# Patient Record
Sex: FEMALE | Race: WHITE | NOT HISPANIC OR LATINO | Employment: FULL TIME | URBAN - METROPOLITAN AREA
[De-identification: names, ages, dates, MRNs, and addresses within clinical notes are randomized per-mention and may not be internally consistent; named-entity substitution may affect disease eponyms.]

---

## 2021-05-29 ENCOUNTER — RECORDS - HEALTHEAST (OUTPATIENT)
Dept: ADMINISTRATIVE | Facility: CLINIC | Age: 36
End: 2021-05-29

## 2021-05-30 ENCOUNTER — RECORDS - HEALTHEAST (OUTPATIENT)
Dept: ADMINISTRATIVE | Facility: CLINIC | Age: 36
End: 2021-05-30

## 2021-05-31 ENCOUNTER — RECORDS - HEALTHEAST (OUTPATIENT)
Dept: ADMINISTRATIVE | Facility: CLINIC | Age: 36
End: 2021-05-31

## 2021-11-22 NOTE — PROGRESS NOTES
"  Assessment & Plan     Chronic bilateral low back pain with bilateral sciatica  Continue medication regimen as below.  Increasing gabapentin dose.  Patient has failed conservative treatments.  Will re-image (last was 2016) and send to spine.   - methocarbamol (ROBAXIN) 500 MG tablet; Take 1-2 tablets (500-1,000 mg) by mouth 4 times daily as needed for muscle spasms  - gabapentin (NEURONTIN) 300 MG capsule; Take 2 capsules (600 mg) by mouth At Bedtime  - Spine Referral; Future  - MR Lumbar Spine w/o Contrast; Future  - XR Pelvis and Hip Bilateral 2 Views; Future    Pelvic pain in female  Chronic, right sided  Has history of hysterectomy and left oophorectomy.  Right ovary remains.  CT scan in 2021 for diverticulitis showed no adnexal abnormality and pain has not recently changed.  Referring to gynecology for this and management of HRT as she is high risk for complications with her smoking history.  - Ob/Gyn Referral; Future    History of ovarian cyst  - Ob/Gyn Referral; Future    Hormone replacement therapy  - Ob/Gyn Referral; Future    High priority for 2019-nCoV vaccine  First of series today.   - COVID-19,PF,MODERNA (18+ Yrs Primary Series .5mL)    Ordering of each unique test  Prescription drug management  25 minutes spent on the date of the encounter doing chart review, history and exam, documentation and further activities per the note       Tobacco Cessation:   reports that she has been smoking. She has a 1.00 pack-year smoking history. She does not have any smokeless tobacco history on file.  Tobacco Cessation Action Plan: Information offered: Patient not interested at this time    BMI:   Estimated body mass index is 31.59 kg/m  as calculated from the following:    Height as of this encounter: 1.57 m (5' 1.8\").    Weight as of this encounter: 77.8 kg (171 lb 9.6 oz).   Weight management plan: Discussed healthy diet and exercise guidelines    Patient Instructions   Schedule MRI of back:  [phone]      No " "follow-ups on file.    Christina Lopez, FELECIA CNP  [facility]IN[facility] Anne is a 36 year old who presents for the following health issues     HPI     New patient; establishing care.  Would like to discuss her back pain.      Takes ibuprofen and gabapentin- does help her nerve pain and sleep but only takes at night.  Wary of taking medications during the daytime that could cause drowsiness (has two children and work).  methocarbamol worked for muscle spasms.  Ran out of this prescription.     History back pain x many years.  MRI 2016.  History back injections.  Mildly helpful.  Last completed physical therapy last year, made pain worse per patient.     Nerve pain left leg, some weakness      Review of Systems   Constitutional, HEENT, cardiovascular, pulmonary, GI, , musculoskeletal, neuro, skin, endocrine and psych systems are negative, except as otherwise noted.      Objective    /77 (BP Location: Left arm, Patient Position: Sitting, Cuff Size: Adult Regular)   Pulse 71   Temp 97.7  F (36.5  C) (Tympanic)   Ht 1.57 m (5' 1.8\")   Wt 77.8 kg (171 lb 9.6 oz)   SpO2 96%   BMI 31.59 kg/m    Body mass index is 31.59 kg/m .  Physical Exam   GENERAL: healthy, alert and no distress  NECK: no adenopathy, no asymmetry, masses, or scars and thyroid normal to palpation  RESP: lungs clear to auscultation - no rales, rhonchi or wheezes  CV: regular rate and rhythm, normal S1 S2, no S3 or S4, no murmur, click or rub, no peripheral edema and peripheral pulses strong  ABDOMEN: soft, nontender, no hepatosplenomegaly, no masses and bowel sounds normal  MS: no gross musculoskeletal defects noted, no edema  NEURO: Normal strength and tone, mentation intact and speech normal  Comprehensive back pain exam:  Tenderness of lumbar paraspinals, Pain limits the following motions: all planes, especially extension, Lower extremity strength functional and equal on both sides, Lower extremity " reflexes within normal limits bilaterally, Lower extremity sensation normal and equal on both sides and Straight leg positive on  left, indicating possible ipsilateral radiculopathy  PSYCH: mentation appears normal, affect normal/bright              Answers for HPI/ROS submitted by the patient on 11/29/2021  CAMILO 7 TOTAL SCORE: 21

## 2021-11-29 ENCOUNTER — OFFICE VISIT (OUTPATIENT)
Dept: FAMILY MEDICINE | Facility: CLINIC | Age: 36
End: 2021-11-29
Payer: COMMERCIAL

## 2021-11-29 VITALS
SYSTOLIC BLOOD PRESSURE: 110 MMHG | BODY MASS INDEX: 31.58 KG/M2 | WEIGHT: 171.6 LBS | HEART RATE: 71 BPM | DIASTOLIC BLOOD PRESSURE: 77 MMHG | HEIGHT: 62 IN | OXYGEN SATURATION: 96 % | TEMPERATURE: 97.7 F

## 2021-11-29 DIAGNOSIS — Z79.890 HORMONE REPLACEMENT THERAPY: ICD-10-CM

## 2021-11-29 DIAGNOSIS — Z23 HIGH PRIORITY FOR 2019-NCOV VACCINE: ICD-10-CM

## 2021-11-29 DIAGNOSIS — M54.41 CHRONIC BILATERAL LOW BACK PAIN WITH BILATERAL SCIATICA: Primary | ICD-10-CM

## 2021-11-29 DIAGNOSIS — Z87.42 HISTORY OF OVARIAN CYST: ICD-10-CM

## 2021-11-29 DIAGNOSIS — Z90.721 HISTORY OF LEFT OOPHORECTOMY: ICD-10-CM

## 2021-11-29 DIAGNOSIS — R10.2 PELVIC PAIN IN FEMALE: ICD-10-CM

## 2021-11-29 DIAGNOSIS — M54.42 CHRONIC BILATERAL LOW BACK PAIN WITH BILATERAL SCIATICA: Primary | ICD-10-CM

## 2021-11-29 DIAGNOSIS — Z90.710 HISTORY OF HYSTERECTOMY: ICD-10-CM

## 2021-11-29 DIAGNOSIS — G89.29 CHRONIC BILATERAL LOW BACK PAIN WITH BILATERAL SCIATICA: Primary | ICD-10-CM

## 2021-11-29 PROBLEM — A60.04 HERPES SIMPLEX VULVOVAGINITIS: Status: ACTIVE | Noted: 2018-05-10

## 2021-11-29 PROBLEM — K57.92 ACUTE DIVERTICULITIS: Status: ACTIVE | Noted: 2019-04-26

## 2021-11-29 PROBLEM — N90.4 LICHEN SCLEROSUS ET ATROPHICUS OF THE VULVA: Status: ACTIVE | Noted: 2018-09-24

## 2021-11-29 PROBLEM — F41.1 GAD (GENERALIZED ANXIETY DISORDER): Status: ACTIVE | Noted: 2019-12-29

## 2021-11-29 PROCEDURE — 99204 OFFICE O/P NEW MOD 45 MIN: CPT | Mod: 25 | Performed by: NURSE PRACTITIONER

## 2021-11-29 PROCEDURE — 0011A COVID-19,PF,MODERNA (18+ YRS PRIMARY SERIES .5ML): CPT | Performed by: NURSE PRACTITIONER

## 2021-11-29 PROCEDURE — 91301 COVID-19,PF,MODERNA (18+ YRS PRIMARY SERIES .5ML): CPT | Performed by: NURSE PRACTITIONER

## 2021-11-29 RX ORDER — METHOCARBAMOL 750 MG/1
750 TABLET, FILM COATED ORAL
COMMUNITY
Start: 2020-07-16 | End: 2021-12-06

## 2021-11-29 RX ORDER — GABAPENTIN 300 MG/1
600 CAPSULE ORAL AT BEDTIME
Qty: 60 CAPSULE | Refills: 1 | Status: SHIPPED | OUTPATIENT
Start: 2021-11-29 | End: 2023-08-10

## 2021-11-29 RX ORDER — ESTRADIOL 0.1 MG/D
PATCH, EXTENDED RELEASE TRANSDERMAL
COMMUNITY
Start: 2021-10-21 | End: 2022-03-16

## 2021-11-29 RX ORDER — IBUPROFEN 600 MG/1
600 TABLET, FILM COATED ORAL
COMMUNITY
Start: 2021-05-18 | End: 2023-08-10

## 2021-11-29 RX ORDER — GABAPENTIN 100 MG/1
100 CAPSULE ORAL AT BEDTIME
COMMUNITY
Start: 2020-11-03

## 2021-11-29 RX ORDER — METHOCARBAMOL 500 MG/1
500-1000 TABLET, FILM COATED ORAL 4 TIMES DAILY PRN
Qty: 60 TABLET | Refills: 1 | Status: SHIPPED | OUTPATIENT
Start: 2021-11-29 | End: 2023-08-10

## 2021-11-29 ASSESSMENT — ANXIETY QUESTIONNAIRES
GAD7 TOTAL SCORE: 21
7. FEELING AFRAID AS IF SOMETHING AWFUL MIGHT HAPPEN: NEARLY EVERY DAY
6. BECOMING EASILY ANNOYED OR IRRITABLE: NEARLY EVERY DAY
3. WORRYING TOO MUCH ABOUT DIFFERENT THINGS: NEARLY EVERY DAY
7. FEELING AFRAID AS IF SOMETHING AWFUL MIGHT HAPPEN: NEARLY EVERY DAY
5. BEING SO RESTLESS THAT IT IS HARD TO SIT STILL: NEARLY EVERY DAY
2. NOT BEING ABLE TO STOP OR CONTROL WORRYING: NEARLY EVERY DAY
1. FEELING NERVOUS, ANXIOUS, OR ON EDGE: NEARLY EVERY DAY
GAD7 TOTAL SCORE: 21
8. IF YOU CHECKED OFF ANY PROBLEMS, HOW DIFFICULT HAVE THESE MADE IT FOR YOU TO DO YOUR WORK, TAKE CARE OF THINGS AT HOME, OR GET ALONG WITH OTHER PEOPLE?: SOMEWHAT DIFFICULT
4. TROUBLE RELAXING: NEARLY EVERY DAY
GAD7 TOTAL SCORE: 21

## 2021-11-29 ASSESSMENT — PAIN SCALES - GENERAL: PAINLEVEL: EXTREME PAIN (8)

## 2021-11-29 ASSESSMENT — MIFFLIN-ST. JEOR: SCORE: 1408.45

## 2021-11-29 NOTE — NURSING NOTE
Prior to immunization administration, verified patients identity using patient s name and date of birth. Please see Immunization Activity for additional information.     Screening Questionnaire for Adult Immunization    Are you sick today?             No   Do you have allergies to medications, food, a vaccine component or latex?   No   Have you ever had a serious reaction after receiving a vaccination?   No   Do you have a long-term health problem with heart, lung, kidney, or metabolic disease (e.g., diabetes), asthma, a blood disorder, no spleen, complement component deficiency, a cochlear implant, or a spinal fluid leak?  Are you on long-term aspirin therapy?   No   Do you have cancer, leukemia, HIV/AIDS, or any other immune system problem?   No   Do you have a parent, brother, or sister with an immune system problem?   No   In the past 3 months, have you taken medications that affect  your immune system, such as prednisone, other steroids, or anticancer drugs; drugs for the treatment of rheumatoid arthritis, Crohn s disease, or psoriasis; or have you had radiation treatments?   No   Have you had a seizure, or a brain or other nervous system problem?   No   During the past year, have you received a transfusion of blood or blood    products, or been given immune (gamma) globulin or antiviral drug?   No   For women: Are you pregnant or is there a chance you could become       pregnant during the next month?   No   Have you received any vaccinations in the past 4 weeks?   No     Immunization questionnaire answers were all negative.        Per orders Jasmin, injection of Covid19 Moderna given by Sandi Cordero. Patient instructed to remain in clinic for 15 minutes afterwards, and to report any adverse reaction to me immediately.       Screening performed by Sandi Cordero on 11/29/2021 at 10:04 AM.

## 2021-11-30 ASSESSMENT — ANXIETY QUESTIONNAIRES: GAD7 TOTAL SCORE: 21

## 2021-12-06 ENCOUNTER — NURSE TRIAGE (OUTPATIENT)
Dept: FAMILY MEDICINE | Facility: CLINIC | Age: 36
End: 2021-12-06
Payer: COMMERCIAL

## 2021-12-06 ENCOUNTER — OFFICE VISIT (OUTPATIENT)
Dept: URGENT CARE | Facility: URGENT CARE | Age: 36
End: 2021-12-06
Payer: COMMERCIAL

## 2021-12-06 VITALS
SYSTOLIC BLOOD PRESSURE: 102 MMHG | DIASTOLIC BLOOD PRESSURE: 73 MMHG | BODY MASS INDEX: 32.03 KG/M2 | WEIGHT: 174 LBS | TEMPERATURE: 98.8 F | HEART RATE: 68 BPM | OXYGEN SATURATION: 98 %

## 2021-12-06 DIAGNOSIS — R10.32 ABDOMINAL PAIN, ACUTE, BILATERAL LOWER QUADRANT: Primary | ICD-10-CM

## 2021-12-06 DIAGNOSIS — R50.9 FEVER AND CHILLS: ICD-10-CM

## 2021-12-06 DIAGNOSIS — Z87.19 HISTORY OF DIVERTICULITIS: ICD-10-CM

## 2021-12-06 DIAGNOSIS — R11.0 NAUSEA: ICD-10-CM

## 2021-12-06 DIAGNOSIS — R10.31 ABDOMINAL PAIN, ACUTE, BILATERAL LOWER QUADRANT: Primary | ICD-10-CM

## 2021-12-06 PROCEDURE — 99214 OFFICE O/P EST MOD 30 MIN: CPT | Performed by: PHYSICIAN ASSISTANT

## 2021-12-06 ASSESSMENT — ENCOUNTER SYMPTOMS
HEARTBURN: 0
HEMATURIA: 0
SHORTNESS OF BREATH: 0
FLANK PAIN: 0
DYSURIA: 0
FATIGUE: 0
CHILLS: 1
CARDIOVASCULAR NEGATIVE: 1
VOMITING: 0
NAUSEA: 1
WHEEZING: 0
HEMATOCHEZIA: 0
COUGH: 0
FEVER: 1
CHEST TIGHTNESS: 0
ABDOMINAL PAIN: 1
DIARRHEA: 0
FREQUENCY: 0
CONSTIPATION: 0
PALPITATIONS: 0
RESPIRATORY NEGATIVE: 1

## 2021-12-06 ASSESSMENT — PAIN SCALES - GENERAL: PAINLEVEL: SEVERE PAIN (6)

## 2021-12-06 NOTE — TELEPHONE ENCOUNTER
"Patient calling, stating her abdomen is very swollen and has been for 3 days pain 7/10. Stated she is experiencing a Diverticulitis flare up, she had this occur last year and was hospitalized because she became septic. RN advised that she go to Urgent Care now and to be seen now, they can do labs, and more physical evaluation and should not wait for an appointment to be seen for this. Patient agrees with this plan.     Reason for Disposition    Constant abdominal pain lasting > 2 hours    Additional Information    Negative: Passed out (i.e., fainted, collapsed and was not responding)    Negative: Shock suspected (e.g., cold/pale/clammy skin, too weak to stand, low BP, rapid pulse)    Negative: Visible sweat on face or sweat is dripping down    Negative: Chest pain    Negative: SEVERE abdominal pain (e.g., excruciating)    Negative: Pain lasting > 10 minutes and over 50 years old    Negative: Pain lasting > 10 minutes and over 40 years old and associated chest, arm, neck, upper back, or jaw pain    Negative: Pain lasting > 10 minutes and over 35 years old and at least one cardiac risk factor    Negative: Pain lasting > 10 minutes and history of heart disease (i.e., heart attack, bypass surgery, angina, angioplasty, CHF)    Negative: Recent injury to the abdomen    Negative: Vomiting red blood or black (coffee ground) material    Negative: Bloody, black, or tarry bowel movements (Exception: chronic-unchanged  black-grey bowel movements and is taking iron pills or Pepto-bismol)    Negative: Pregnant > 24 weeks and hand or face swelling    Answer Assessment - Initial Assessment Questions  1. LOCATION: \"Where does it hurt?\"       Entire abdomen hurts  2. RADIATION: \"Does the pain shoot anywhere else?\" (e.g., chest, back)      Into her left sciatic area  3. ONSET: \"When did the pain begin?\" (e.g., minutes, hours or days ago)       3 days ago  4. SUDDEN: \"Gradual or sudden onset?\"      Sudden onset  5. PATTERN \"Does the " "pain come and go, or is it constant?\"     - If constant: \"Is it getting better, staying the same, or worsening?\"       (Note: Constant means the pain never goes away completely; most serious pain is constant and it progresses)      - If intermittent: \"How long does it last?\" \"Do you have pain now?\"      (Note: Intermittent means the pain goes away completely between bouts)      Constant, positional and getting worse.   6. SEVERITY: \"How bad is the pain?\"  (e.g., Scale 1-10; mild, moderate, or severe)     - MILD (1-3): doesn't interfere with normal activities, abdomen soft and not tender to touch      - MODERATE (4-7): interferes with normal activities or awakens from sleep, tender to touch      - SEVERE (8-10): excruciating pain, doubled over, unable to do any normal activities        7  7. RECURRENT SYMPTOM: \"Have you ever had this type of abdominal pain before?\" If so, ask: \"When was the last time?\" and \"What happened that time?\"       A year ago, was sepstic due to a bad diverticulitis flare up.   8. AGGRAVATING FACTORS: \"Does anything seem to cause this pain?\" (e.g., foods, stress, alcohol)      No, food does make me feel nauseas  9. CARDIAC SYMPTOMS: \"Do you have any of the following symptoms: chest pain, difficulty breathing, sweating, nausea?\"      Nausea.   10. OTHER SYMPTOMS: \"Do you have any other symptoms?\" (e.g., fever, vomiting, diarrhea)        na  11. PREGNANCY: \"Is there any chance you are pregnant?\" \"When was your last menstrual period?\"        na    Protocols used: ABDOMINAL PAIN - UPPER-A-OH    Chloe Peters RN BSN      "

## 2021-12-06 NOTE — PROGRESS NOTES
Julian Anne is a 36 year old who presents for the following health issues   HPI   Abdominal/Flank Pain  Onset/Duration: 3days  Description:   Character: sharp  Location: bilateral lower quandrants   Radiation: None  Intensity: moderate  Progression of Symptoms:  worsening  Accompanying Signs & Symptoms:  Fever/Chills: Yes  Gas/Bloating: no  Nausea: Yes  Vomitting: no  Diarrhea: no  Constipation: no  Dysuria or Hematuria: No dysuria, urinary frequency, urgency or hematuria.  No vaginal d/c, bleeding, rashes and irritation.  No new partners.   History:   Trauma: no  Previous similar pain: Yes, with her previous diverticulitis and ovarian cysts  Previous tests done: none  Precipitating factors:   Does the pain change with:     Food: YES- worse    Bowel Movement: no    Urination: no   Other factors:  no  Therapies tried and outcome: fluids, advil, tylenol, ibuprofen with minimal relief     Patient Active Problem List   Diagnosis     Acute diverticulitis     Lichen sclerosus et atrophicus of the vulva     Herpes simplex vulvovaginitis     CAMILO (generalized anxiety disorder)         Current Outpatient Medications   Medication     EDWARD 0.1 MG/24HR bi-weekly patch     gabapentin (NEURONTIN) 100 MG capsule     gabapentin (NEURONTIN) 300 MG capsule     ibuprofen (ADVIL/MOTRIN) 600 MG tablet     methocarbamol (ROBAXIN) 500 MG tablet     No current facility-administered medications for this visit.        Allergies   Allergen Reactions     Vancomycin Itching     Complained of itching hands/arms after infusion completed.     Hydromorphone Itching     Amoxicillin Rash     Erythromycin Nausea and Vomiting and Rash     Vicodin [Hydrocodone-Acetaminophen] Rash       Review of Systems   Constitutional: Positive for chills and fever. Negative for fatigue.   Respiratory: Negative.  Negative for cough, chest tightness, shortness of breath and wheezing.    Cardiovascular: Negative.  Negative for chest pain, palpitations and  peripheral edema.   Gastrointestinal: Positive for abdominal pain and nausea. Negative for constipation, diarrhea, heartburn, hematochezia and vomiting.   Genitourinary: Negative for dysuria, flank pain, frequency, hematuria, pelvic pain, urgency, vaginal bleeding and vaginal discharge.   All other systems reviewed and are negative.           Objective    /73 (BP Location: Left arm, Patient Position: Sitting, Cuff Size: Adult Regular)   Pulse 68   Temp 98.8  F (37.1  C) (Oral)   Wt 78.9 kg (174 lb)   SpO2 98%   BMI 32.03 kg/m    Body mass index is 32.03 kg/m .  Physical Exam  Vitals and nursing note reviewed.   Constitutional:       General: She is not in acute distress.     Appearance: Normal appearance. She is well-developed. She is obese. She is not ill-appearing.   Cardiovascular:      Rate and Rhythm: Normal rate and regular rhythm.      Pulses: Normal pulses.      Heart sounds: Normal heart sounds, S1 normal and S2 normal. No murmur heard.  No friction rub. No gallop.    Pulmonary:      Effort: Pulmonary effort is normal. No accessory muscle usage or respiratory distress.      Breath sounds: Normal breath sounds and air entry. No decreased breath sounds, wheezing, rhonchi or rales.   Abdominal:      General: Abdomen is protuberant. Bowel sounds are normal.      Palpations: Abdomen is soft. There is no hepatomegaly, splenomegaly or mass.      Tenderness: There is abdominal tenderness in the right lower quadrant and left lower quadrant. There is guarding and rebound. There is no right CVA tenderness or left CVA tenderness. Positive signs include Rovsing's sign, McBurney's sign, psoas sign and obturator sign. Negative signs include Benson's sign.      Hernia: No hernia is present.   Genitourinary:     Comments: Declined pelvic exam.  Skin:     General: Skin is warm and dry.   Neurological:      Mental Status: She is alert and oriented to person, place, and time.   Psychiatric:         Mood and Affect:  Mood normal.         Behavior: Behavior normal.         Thought Content: Thought content normal.         Judgment: Judgment normal.            Assessment/Plan:  Abdominal pain, acute, bilateral lower quadrant: Along with fever/chills, n/v, and hx of diverticulitis/ovarian cysts.  H&P is concerning for diverticulitis vs appendicitis vs GYN vs kidney stones/infection.  Recommend further evaluation and management in the ER.  Will most likely need further workup with labs and/or imaging.  Patient has declined transportation via ambulance and will have family drive her/him.  Understands risks and benefits of ambulance transfer and s/he has declined.  Call 911 if worsening symptoms.  S/he plans to go to [facility].  S/he left in stable condition with AVS in hand.  F/u with PCP after ER visit.     Nausea    Fever and chills    History of diverticulitis        Lina See PRECIOUS [place]

## 2021-12-27 ENCOUNTER — IMMUNIZATION (OUTPATIENT)
Dept: NURSING | Facility: CLINIC | Age: 36
End: 2021-12-27
Attending: NURSE PRACTITIONER
Payer: COMMERCIAL

## 2021-12-27 PROCEDURE — 91301 PR COVID VAC MODERNA 100 MCG/0.5 ML IM: CPT

## 2021-12-27 PROCEDURE — 0012A PR COVID VAC MODERNA 100 MCG/0.5 ML IM: CPT

## 2022-03-16 ENCOUNTER — VIRTUAL VISIT (OUTPATIENT)
Dept: FAMILY MEDICINE | Facility: CLINIC | Age: 37
End: 2022-03-16
Payer: COMMERCIAL

## 2022-03-16 DIAGNOSIS — B37.31 CANDIDIASIS OF VAGINA: ICD-10-CM

## 2022-03-16 DIAGNOSIS — N39.0 URINARY TRACT INFECTION WITHOUT HEMATURIA, SITE UNSPECIFIED: Primary | ICD-10-CM

## 2022-03-16 DIAGNOSIS — Z79.890 HORMONE REPLACEMENT THERAPY: ICD-10-CM

## 2022-03-16 PROCEDURE — 99214 OFFICE O/P EST MOD 30 MIN: CPT | Mod: 95 | Performed by: INTERNAL MEDICINE

## 2022-03-16 RX ORDER — FLUCONAZOLE 150 MG/1
TABLET ORAL
Qty: 2 TABLET | Refills: 0 | Status: SHIPPED | OUTPATIENT
Start: 2022-03-16 | End: 2023-08-10

## 2022-03-16 RX ORDER — ESTRADIOL 0.1 MG/D
1 PATCH, EXTENDED RELEASE TRANSDERMAL
Qty: 24 PATCH | Refills: 0 | Status: SHIPPED | OUTPATIENT
Start: 2022-03-17 | End: 2023-08-10

## 2022-03-16 RX ORDER — NITROFURANTOIN 25; 75 MG/1; MG/1
100 CAPSULE ORAL 2 TIMES DAILY
Qty: 10 CAPSULE | Refills: 0 | Status: SHIPPED | OUTPATIENT
Start: 2022-03-16 | End: 2023-08-10

## 2022-03-16 RX ORDER — ESTRADIOL 0.1 MG/D
PATCH, EXTENDED RELEASE TRANSDERMAL
OUTPATIENT
Start: 2022-03-16

## 2022-03-16 NOTE — PATIENT INSTRUCTIONS
Patient Education     Bladder Infection, Female (Adult)     Urine normally doesn't have any germs (bacteria) in it. But bacteria can get into the urinary tract from the skin around the rectum. Or they can travel in the blood from other parts of the body. Once they are in your urinary tract, they can cause infection in these areas:    The urethra (urethritis)    The bladder (cystitis)    The kidneys (pyelonephritis)  The most common place for an infection is in the bladder. This is called a bladder infection. This is one of the most common infections in women. Most bladder infections are easily treated. They are not serious unless the infection spreads to the kidney.  The terms bladder infection, UTI, and cystitis are often used to describe the same thing. But they are not always the same. Cystitis is an inflammation of the bladder. The most common cause of cystitis is an infection.  Symptoms  The infection causes inflammation in the urethra and bladder. This causes many of the symptoms. The most common symptoms of a bladder infection are:    Pain or burning when urinating    Having to urinate more often than normal    Urgent need to urinate    Only a small amount of urine comes out    Blood in urine    Belly (abdominal) discomfort. This is often in the lower belly above the pubic bone.    Cloudy urine    Strong- or bad-smelling urine    Unable to urinate (urinary retention)    Unable to hold urine in (urinary incontinence)    Fever    Loss of appetite    Confusion (in older adults)  Causes  Bladder infections are not contagious. You can't get one from someone else, from a toilet seat, or from sharing a bath.  The most common cause of bladder infections is bacteria from the bowels. The bacteria get onto the skin around the opening of the urethra. From there, they can get into the urine. Then they travel up to the bladder, causing inflammation and infection. This often happens because of:    Wiping incorrectly after  urinating. Always wipe from front to back.    Bowel incontinence    Pregnancy    Procedures such as having a catheter put in    Older age    Not emptying your bladder. This can give bacteria a chance to grow in your urine.    Fluid loss (dehydration)    Constipation    Having sex    Using a diaphragm for birth control   Treatment  Bladder infections are diagnosed by a urine test and urine culture. They are treated with antibiotics. They often clear up quickly without problems. Treatment helps prevent a more serious kidney infection.  Medicines  Medicines can help in the treatment of a bladder infection:    Take antibiotics until they are used up, even if you feel better. It's important to finish them to make sure the infection has cleared.    You can use acetaminophen or ibuprofen for pain, fever, or discomfort, unless another medicine was prescribed. If you have long-term (chronic) liver or kidney disease, talk with your healthcare provider before using these medicines. Also talk with your provider if you've ever had a stomach ulcer or GI (gastrointestinal) bleeding, or are taking blood-thinner medicines.    If you are given phenazopydridine to reduce burning with urination, it will make your urine a bright orange color. This can stain clothing.  Care and prevention  These self-care steps can help prevent future infections:    Drink plenty of fluids. This helps to prevent dehydration and flush out your bladder. Do this unless you must restrict fluids for other health reasons, or your healthcare provider told you not to.    Clean yourself correctly after going to the bathroom. Wipe from front to back after using the toilet. This helps prevent the spread of bacteria.    Urinate more often. Don't try to hold urine in for a long time.    Wear loose-fitting clothes and cotton underwear. Don't wear tight-fitting pants.    Improve your diet and prevent constipation. Eat more fresh fruits and vegetables, and fiber. Eat  less junk foods and fatty foods.    Don't have sex until your symptoms are gone.    Don't have caffeine, alcohol, and spicy foods. These can irritate your bladder.    Urinate right after you have sex to flush out your bladder.    If you use birth control pills and have frequent bladder infections, discuss it with your healthcare provider.  Follow-up care  Call your healthcare provider if all symptoms are not gone after 3 days of treatment. This is especially important if you have repeat infections.  If a culture was done, you will be told if your treatment needs to be changed. If directed, you can call to find out the results.  If X-rays were done, you will be told if the results will affect your treatment.  Call 911  Call 911 if any of the following occur:    Trouble breathing    Hard to wake up or confusion    Fainting (loss of consciousness)    Fast heart rate  When to get medical advice  Call your healthcare provider right away if any of these occur:    Fever of 100.4 F (38.0 C) or higher, or as directed by your healthcare provider    Symptoms are not better after 3 days of treatment    Back or belly pain that gets worse    Repeated vomiting, or unable to keep medicine down    Weakness or dizziness    Vaginal discharge    Pain, redness, or swelling in the outer vaginal area (labia)  [employer] last reviewed this educational content on 11/1/2019 2000-2021 [employer]. All rights reserved. This information is not intended as a substitute for professional medical care. Always follow your healthcare professional's instructions.           Patient Education     Preventing Vaginal Infection  These steps can help you stay comfortable when treating a vaginal infection. They also help prevent future vaginal infections.   Keeping a healthy balance  Factors that change the normal balance in the vagina can lead to a vaginal infection. To help keep the balance normal, try these tips:     Change out of wet  bathing suits and damp exercise clothes as soon as possible. Yeast thrive in a warm, moist environment.    Don't wear tight pants. Choose cotton underwear and stockings that have a cotton crotch. Cotton keeps you cooler and drier than synthetics.    Don't douche unless advised by your healthcare provider. Douching can destroy friendly bacteria and change the vagina's normal balance.    Wipe from front to back after using the toilet. This prevents bacteria from spreading from the anus to the vulva.    Wash the vulva with mild, unscented soap or with plain water.    Wash your diaphragm, spermicide applicators, and sex toys with mild soap and water after use. Dry them thoroughly before putting them away.    Change tampons often (every 2 hours to 4 hours). Leaving a tampon in for too long may disrupt the balance of vaginal bacteria.    Don't use vaginal sprays, scented toilet paper and soaps, and deodorant tampons or pads. These can cause vaginal irritation  Staying healthy overall  Good overall health can help you resist infection. To be healthier:     Help protect yourself from STIs (sexually transmitted infections) by using latex condoms during sex. Ask your healthcare provider for more information about safer sex.    Eat a variety of healthy foods.    Exercise regularly.    Get enough rest and sleep.    Stay at a healthy weight. If you need to lose weight, ask your provider for advice on how to start.  [employer] last reviewed this educational content on 6/1/2020 2000-2021 [employer]. All rights reserved. This information is not intended as a substitute for professional medical care. Always follow your healthcare professional's instructions.

## 2022-03-16 NOTE — PROGRESS NOTES
Dayana is a 36 year old who is being evaluated via a billable telephone visit.      What phone number would you like to be contacted at? [phone]  How would you like to obtain your AVS? MyChart    Assessment & Plan     Urinary tract infection without hematuria, site unspecified  She has all the typical symptoms of UTI  She had UTIs in the past and it feels the same  We discussed the options about blindly treating versus testing and treating  She would like to for now we will go ahead with empirically treating  If her symptoms do not get better after the current course of antibiotics then she will need UA and wet prep  - nitroFURantoin macrocrystal-monohydrate (MACROBID) 100 MG capsule; Take 1 capsule (100 mg) by mouth 2 times daily    Candidiasis of vagina  She also has some vaginal discharge and she thinks that she is have a yeast infection  She had them in the past and vaginal creams did not work then  Again if her symptoms do not get better with this fluconazole she will need assessment in the clinic and wet prep and may be vaginitis panel  - fluconazole (DIFLUCAN) 150 MG tablet; Take 1 tablet by mouth and if symptoms do not resolve in 72 hrs take another tablet    Hormone replacement therapy  She is on hormone replacement therapy for a long time  She had hysterectomy and bilateral salpingo-oophorectomy  She is now getting hot sweats and also having vaginal dryness  She was wondering about increasing the dose of her estrogen  She does have a family history of breast cancer  Her grandmother had breast cancer when she was in her 50s  She certainly needs evaluation by a gynecologist to discuss her options about hormone replacement therapy and also about probably starting early mammograms  She is going to make an appointment with them  For now I have given a refill to cover for 3 months so that she has time to make the appointment with gynecologist  - EDWARD 0.1 MG/24HR bi-weekly patch; Place 1 patch onto the skin  twice a week      30 minutes spent on the date of the encounter doing chart review, history and exam, documentation and further activities per the note           Return in about 4 weeks (around 4/13/2022), or if symptoms worsen or fail to improve.    Reginaldo Wheeler MD  [facility] KITTY Anne is a 36 year old who presents for the following health issues     HPI     Genitourinary - Female  Onset/Duration: 3/12/22    Description:   Painful urination (Dysuria): YES           Frequency: YES  Blood in urine (Hematuria): no  Delay in urine (Hesitency): YES  Intensity: mild  Progression of Symptoms:  worsening  Accompanying Signs & Symptoms:  Fever/chills: no  Flank pain: YES- localized to right side  Nausea and vomiting: no  Vaginal symptoms: none and itching  Abdominal/Pelvic Pain: YES abdomen  History:   History of frequent UTI s: YES  History of kidney stones: YES  Sexually Active: YES  Possibility of pregnancy: No  Precipitating or alleviating factors: None  Therapies tried and outcome: Increase fluid intake and cranberrry supplement , AZO        Review of Systems   Constitutional, HEENT, cardiovascular, pulmonary, gi and gu systems are negative, except as otherwise noted.      Objective    Vitals - Patient Reported  Pain Score: Severe Pain (6)  Pain Loc: Low Back        Physical Exam   healthy, alert and no distress  PSYCH: Alert and oriented times 3; coherent speech, normal   rate and volume, able to articulate logical thoughts, able   to abstract reason, no tangential thoughts, no hallucinations   or delusions  Her affect is normal  RESP: No cough, no audible wheezing, able to talk in full sentences  Remainder of exam unable to be completed due to telephone visits                Phone call duration: 15 minutes

## 2022-03-16 NOTE — TELEPHONE ENCOUNTER
I referred her to gynecology last visit for management of her hormone replacement therapy.  She should discuss with gynecology.   FELECIA Riley CNP

## 2022-03-16 NOTE — TELEPHONE ENCOUNTER
Patient calling to request refill for EDWARD 0.1 MG/24HR bi-weekly patch. Patient states she is out of the medication.    Patient states the patch is giving her a rash. Patient requesting to be changed over to a pill if possible.     Routing to provider to review and advise.    Debbi Tierney RN  [facility]

## 2022-03-17 ENCOUNTER — TELEPHONE (OUTPATIENT)
Dept: FAMILY MEDICINE | Facility: CLINIC | Age: 37
End: 2022-03-17
Payer: COMMERCIAL

## 2022-03-17 DIAGNOSIS — Z79.890 HORMONE REPLACEMENT THERAPY: ICD-10-CM

## 2022-03-17 RX ORDER — ESTRADIOL 0.1 MG/D
1 PATCH, EXTENDED RELEASE TRANSDERMAL
Qty: 24 PATCH | Refills: 0 | Status: CANCELLED | OUTPATIENT
Start: 2022-03-17

## 2022-03-17 NOTE — TELEPHONE ENCOUNTER
EDWARD 0.1 MG/24HR bi-weekly patch 24 patch 0 3/17/2022     This brand is not available from out drug supplier currently, no expected date is given. Patient will either need to get this at another pharmacy that has it available or get PA done for generic.

## 2022-03-18 ENCOUNTER — OFFICE VISIT (OUTPATIENT)
Dept: URGENT CARE | Facility: URGENT CARE | Age: 37
End: 2022-03-18
Payer: COMMERCIAL

## 2022-03-18 VITALS
HEIGHT: 61 IN | SYSTOLIC BLOOD PRESSURE: 133 MMHG | WEIGHT: 171 LBS | OXYGEN SATURATION: 97 % | BODY MASS INDEX: 32.28 KG/M2 | TEMPERATURE: 97.1 F | DIASTOLIC BLOOD PRESSURE: 84 MMHG | HEART RATE: 87 BPM

## 2022-03-18 DIAGNOSIS — Z90.721 S/P ABDOMINAL HYSTERECTOMY AND LEFT SALPINGO-OOPHORECTOMY: ICD-10-CM

## 2022-03-18 DIAGNOSIS — Z80.3 FHX: BREAST CANCER: ICD-10-CM

## 2022-03-18 DIAGNOSIS — R10.31 RLQ ABDOMINAL PAIN: Primary | ICD-10-CM

## 2022-03-18 DIAGNOSIS — Z90.79 S/P ABDOMINAL HYSTERECTOMY AND LEFT SALPINGO-OOPHORECTOMY: ICD-10-CM

## 2022-03-18 DIAGNOSIS — Z90.710 S/P ABDOMINAL HYSTERECTOMY AND LEFT SALPINGO-OOPHORECTOMY: ICD-10-CM

## 2022-03-18 LAB
ALBUMIN UR-MCNC: NEGATIVE MG/DL
APPEARANCE UR: CLEAR
BACTERIA #/AREA URNS HPF: ABNORMAL /HPF
BASOPHILS # BLD AUTO: 0.1 10E3/UL (ref 0–0.2)
BASOPHILS NFR BLD AUTO: 1 %
BILIRUB UR QL STRIP: NEGATIVE
COLOR UR AUTO: YELLOW
EOSINOPHIL # BLD AUTO: 0.2 10E3/UL (ref 0–0.7)
EOSINOPHIL NFR BLD AUTO: 2 %
ERYTHROCYTE [DISTWIDTH] IN BLOOD BY AUTOMATED COUNT: 13.1 % (ref 10–15)
ERYTHROCYTE [SEDIMENTATION RATE] IN BLOOD BY WESTERGREN METHOD: 8 MM/HR (ref 0–20)
GLUCOSE UR STRIP-MCNC: NEGATIVE MG/DL
HCT VFR BLD AUTO: 46.7 % (ref 35–47)
HGB BLD-MCNC: 15.2 G/DL (ref 11.7–15.7)
HGB UR QL STRIP: ABNORMAL
IMM GRANULOCYTES # BLD: 0 10E3/UL
IMM GRANULOCYTES NFR BLD: 0 %
KETONES UR STRIP-MCNC: NEGATIVE MG/DL
LEUKOCYTE ESTERASE UR QL STRIP: NEGATIVE
LYMPHOCYTES # BLD AUTO: 3.6 10E3/UL (ref 0.8–5.3)
LYMPHOCYTES NFR BLD AUTO: 34 %
MCH RBC QN AUTO: 30 PG (ref 26.5–33)
MCHC RBC AUTO-ENTMCNC: 32.5 G/DL (ref 31.5–36.5)
MCV RBC AUTO: 92 FL (ref 78–100)
MONOCYTES # BLD AUTO: 1.1 10E3/UL (ref 0–1.3)
MONOCYTES NFR BLD AUTO: 10 %
NEUTROPHILS # BLD AUTO: 5.7 10E3/UL (ref 1.6–8.3)
NEUTROPHILS NFR BLD AUTO: 53 %
NITRATE UR QL: NEGATIVE
PH UR STRIP: 5.5 [PH] (ref 5–7)
PLATELET # BLD AUTO: 387 10E3/UL (ref 150–450)
RBC # BLD AUTO: 5.06 10E6/UL (ref 3.8–5.2)
RBC #/AREA URNS AUTO: ABNORMAL /HPF
SP GR UR STRIP: >=1.03 (ref 1–1.03)
SQUAMOUS #/AREA URNS AUTO: ABNORMAL /LPF
UROBILINOGEN UR STRIP-ACNC: 0.2 E.U./DL
WBC # BLD AUTO: 10.7 10E3/UL (ref 4–11)
WBC #/AREA URNS AUTO: ABNORMAL /HPF

## 2022-03-18 PROCEDURE — 36415 COLL VENOUS BLD VENIPUNCTURE: CPT | Performed by: PHYSICIAN ASSISTANT

## 2022-03-18 PROCEDURE — 87086 URINE CULTURE/COLONY COUNT: CPT | Performed by: PHYSICIAN ASSISTANT

## 2022-03-18 PROCEDURE — 99214 OFFICE O/P EST MOD 30 MIN: CPT | Performed by: PHYSICIAN ASSISTANT

## 2022-03-18 PROCEDURE — 85652 RBC SED RATE AUTOMATED: CPT | Performed by: PHYSICIAN ASSISTANT

## 2022-03-18 PROCEDURE — 85025 COMPLETE CBC W/AUTO DIFF WBC: CPT | Performed by: PHYSICIAN ASSISTANT

## 2022-03-18 PROCEDURE — 81001 URINALYSIS AUTO W/SCOPE: CPT | Performed by: PHYSICIAN ASSISTANT

## 2022-03-18 RX ORDER — ESTRADIOL 0.1 MG/D
1 FILM, EXTENDED RELEASE TRANSDERMAL
Qty: 24 PATCH | Refills: 0 | Status: SHIPPED | OUTPATIENT
Start: 2022-03-21 | End: 2023-08-10

## 2022-03-18 RX ORDER — ESTRADIOL 0.1 MG/D
1 PATCH TRANSDERMAL WEEKLY
Qty: 4 PATCH | Refills: 0 | Status: SHIPPED | OUTPATIENT
Start: 2022-03-18 | End: 2022-04-15

## 2022-03-18 NOTE — PROGRESS NOTES
S: 35 yo female presents for right lower quadrant pain for 2 days.  No fever, nausea, vomiting, flank pain.  Ran out of her hormone patch 3 months ago and feels like this could be the culprit.  Could be getting ovarian cysts now that she ran out of her hormone patch.  Had a virtual visit in March and was given prescription but [facility] in [place] did not carry it.  Significant family history for breast cancer.  Quit smoking 3 months ago.  No history of personal blood clots.  No family history of blood clots.   Has upcoming appointment with OB/GYN in [place] the second week of April.  Had negative pelvic ultrasound in December.  No concerns about STD.  Rates the pain 3 out of 10.  Appetite normal.      S/P hysterectomy with left oophorectomy.  History of recurrent ovarian cyst on the right.  Also history of diverticulosis/diverticulitis.      Allergies   Allergen Reactions     Vancomycin Itching     Complained of itching hands/arms after infusion completed.     Hydromorphone Itching     Amoxicillin Rash     Erythromycin Nausea and Vomiting and Rash     Vicodin [Hydrocodone-Acetaminophen] Rash       No past medical history on file.    EDWARD 0.1 MG/24HR bi-weekly patch, Place 1 patch onto the skin twice a week  fluconazole (DIFLUCAN) 150 MG tablet, Take 1 tablet by mouth and if symptoms do not resolve in 72 hrs take another tablet  gabapentin (NEURONTIN) 100 MG capsule, Take 100 mg by mouth  gabapentin (NEURONTIN) 300 MG capsule, Take 2 capsules (600 mg) by mouth At Bedtime  ibuprofen (ADVIL/MOTRIN) 600 MG tablet, Take 600 mg by mouth  methocarbamol (ROBAXIN) 500 MG tablet, Take 1-2 tablets (500-1,000 mg) by mouth 4 times daily as needed for muscle spasms  nitroFURantoin macrocrystal-monohydrate (MACROBID) 100 MG capsule, Take 1 capsule (100 mg) by mouth 2 times daily    No current facility-administered medications on file prior to visit.      Social History     Tobacco Use     Smoking status: Former Smoker      "Packs/day: 0.10     Years: 10.00     Pack years: 1.00     Smokeless tobacco: Never Used     Tobacco comment: 3 months ago quit   Vaping Use     Vaping Use: Never used   Substance Use Topics     Alcohol use: No     Drug use: No       ROS:  General: negative for fever  Resp: negative for chest pain   CV: negative for chest pain  ABD: as above  : negative for dysuria  Neurologic:negative for Headache    OBJECTIVE:  /84 (BP Location: Left arm, Patient Position: Sitting, Cuff Size: Adult Regular)   Pulse 87   Temp 97.1  F (36.2  C) (Tympanic)   Ht 1.549 m (5' 1\")   Wt 77.6 kg (171 lb)   SpO2 97%   BMI 32.31 kg/m     General:   awake, alert, and cooperative.  NAD.   Head: Normocephalic, atraumatic.  Eyes: Conjunctiva clear, non icteric.   Heart: Regular rate and rhythm. No murmur.  Lungs: Chest is clear; no wheezes or rales.  ABD: soft, mild right lower quadrant tenderness.  No rigidity, guarding or rebound , bowel sounds intact  Neuro: Alert and oriented - normal speech.     ASSESSMENT:    ICD-10-CM    1. RLQ abdominal pain  R10.31 UA Macro with Reflex to Micro and Culture - lab collect     estradiol (VIVELLE-DOT) 0.1 MG/24HR bi-weekly patch     CBC with platelets and differential     ESR: Erythrocyte sedimentation rate     UA Macro with Reflex to Micro and Culture - lab collect     CBC with platelets and differential     ESR: Erythrocyte sedimentation rate     Urine Microscopic     Urine Culture Aerobic Bacterial - lab collect     Urine Culture Aerobic Bacterial - lab collect   2. S/P abdominal hysterectomy and left salpingo-oophorectomy  Z90.710 estradiol (VIVELLE-DOT) 0.1 MG/24HR bi-weekly patch    Z90.79 CBC with platelets and differential    Z90.721 ESR: Erythrocyte sedimentation rate     CBC with platelets and differential     ESR: Erythrocyte sedimentation rate     Urine Culture Aerobic Bacterial - lab collect     Urine Culture Aerobic Bacterial - lab collect   3. FHx: breast cancer  Z80.3 estradiol " (VIVELLE-DOT) 0.1 MG/24HR bi-weekly patch     CBC with platelets and differential     ESR: Erythrocyte sedimentation rate     CBC with platelets and differential     ESR: Erythrocyte sedimentation rate     Urine Culture Aerobic Bacterial - lab collect     Urine Culture Aerobic Bacterial - lab collect             PLAN: Vital signs stable.  Continue to abstain from smoking.  Very mild right lower quadrant tenderness, 3 out of 10.  Labs done today are normal.  Could be ovarian cyst.  Had negative pelvic ultrasound 3 months ago.  To ER if worsens, or new signs or symptoms develop.  Patient will take hard copy of Evelin estrogen patch prescription and call to different pharmacies to see if they carry it.  Keep OB/GYN appointment for second week in April.       Advised about symptoms which might herald more serious problems.        Selene Cho PA-C

## 2022-03-18 NOTE — LETTER
March 20, 2022      Dayana Mcclain  [address] [address]        Dear ,    We are writing to inform you of your test results.    Your test results fall within the expected range(s). Your urine culture was negative.    Enclosed is a copy of these results.    Resulted Orders   UA Macro with Reflex to Micro and Culture - lab collect   Result Value Ref Range    Color Urine Yellow Colorless, Straw, Light Yellow, Yellow    Appearance Urine Clear Clear    Glucose Urine Negative Negative mg/dL    Bilirubin Urine Negative Negative    Ketones Urine Negative Negative mg/dL    Specific Gravity Urine >=1.030 1.003 - 1.035    Blood Urine Trace (A) Negative    pH Urine 5.5 5.0 - 7.0    Protein Albumin Urine Negative Negative mg/dL    Urobilinogen Urine 0.2 0.2, 1.0 E.U./dL    Nitrite Urine Negative Negative    Leukocyte Esterase Urine Negative Negative   ESR: Erythrocyte sedimentation rate   Result Value Ref Range    Erythrocyte Sedimentation Rate 8 0 - 20 mm/hr   CBC with platelets and differential   Result Value Ref Range    WBC Count 10.7 4.0 - 11.0 10e3/uL    RBC Count 5.06 3.80 - 5.20 10e6/uL    Hemoglobin 15.2 11.7 - 15.7 g/dL    Hematocrit 46.7 35.0 - 47.0 %    MCV 92 78 - 100 fL    MCH 30.0 26.5 - 33.0 pg    MCHC 32.5 31.5 - 36.5 g/dL    RDW 13.1 10.0 - 15.0 %    Platelet Count 387 150 - 450 10e3/uL    % Neutrophils 53 %    % Lymphocytes 34 %    % Monocytes 10 %    % Eosinophils 2 %    % Basophils 1 %    % Immature Granulocytes 0 %    Absolute Neutrophils 5.7 1.6 - 8.3 10e3/uL    Absolute Lymphocytes 3.6 0.8 - 5.3 10e3/uL    Absolute Monocytes 1.1 0.0 - 1.3 10e3/uL    Absolute Eosinophils 0.2 0.0 - 0.7 10e3/uL    Absolute Basophils 0.1 0.0 - 0.2 10e3/uL    Absolute Immature Granulocytes 0.0 <=0.4 10e3/uL   Urine Microscopic   Result Value Ref Range    Bacteria Urine Few (A) None Seen /HPF    RBC Urine 0-2 0-2 /HPF /HPF    WBC Urine 0-5 0-5 /HPF /HPF    Squamous Epithelials Urine Few (A) None  Seen /LPF    Narrative    Urine Culture not indicated   Urine Culture Aerobic Bacterial - lab collect   Result Value Ref Range    Culture 50,000-100,000 CFU/mL Mixture of urogenital richard        If you have any questions or concerns, please call the clinic at the number listed above.   Thank you for choosing  [facility].      Sincerely,      Selene Cho PA-C

## 2022-03-18 NOTE — TELEPHONE ENCOUNTER
New prescription sent for Climara  Discussed with patient and told her to discuss this with her gynecologist at the next appointment and see if this medication is appropriate and can be continued going forward or if she needs a different hormone replacement therapy

## 2022-03-20 LAB — BACTERIA UR CULT: NORMAL

## 2022-04-15 DIAGNOSIS — Z79.890 HORMONE REPLACEMENT THERAPY: ICD-10-CM

## 2022-04-15 RX ORDER — ESTRADIOL 0.1 MG/D
PATCH TRANSDERMAL
Qty: 4 PATCH | Refills: 0 | Status: SHIPPED | OUTPATIENT
Start: 2022-04-15 | End: 2023-08-10

## 2022-06-26 ENCOUNTER — NURSE TRIAGE (OUTPATIENT)
Dept: NURSING | Facility: CLINIC | Age: 37
End: 2022-06-26

## 2022-06-26 NOTE — TELEPHONE ENCOUNTER
S-(situation): constant cough    B-(background):   Seen at [facility] on 6/21 - negative COVID, strep, flu.  Tested 10x for COVID, all were negative  Returned to work on Friday. Symptoms are getting worse      A-(assessment):   Worsening dry cough  Loss of taste and smell  Sinus congestion  Sore throat  Some chest tightness    No fever    R-(recommendations): be seen within 24 hours. /WIC recommended.   Pt will go to [facility].    Ibis Wilson RN/[place] Nurse Advisor          Reason for Disposition    SEVERE coughing spells (e.g., whooping sound after coughing, vomiting after coughing)    Additional Information    Negative: Severe difficulty breathing (e.g., struggling for each breath, speaks in single words)    Negative: Bluish (or gray) lips or face now    Negative: [1] Rapid onset of cough AND [2] has hives    Negative: Coughing started suddenly after medicine, an allergic food or bee sting    Negative: [1] Difficulty breathing AND [2] exposure to flames, smoke, or fumes    Negative: [1] Stridor AND [2] difficulty breathing    Negative: Sounds like a life-threatening emergency to the triager    Negative: Chest pain  (Exception: MILD central chest pain, present only when coughing)    Negative: Difficulty breathing    Negative: Patient sounds very sick or weak to the triager    Negative: [1] Coughed up blood AND [2] > 1 tablespoon (15 ml) (Exception: blood-tinged sputum)    Negative: Fever > 103 F (39.4 C)    Negative: [1] Fever > 101 F (38.3 C) AND [2] age > 60    Negative: [1] Fever > 100.0 F (37.8 C) AND [2] bedridden (e.g., nursing home patient, CVA, chronic illness, recovering from surgery)    Negative: [1] Fever > 100.0 F (37.8 C) AND [2] diabetes mellitus or weak immune system (e.g., HIV positive, cancer chemo, splenectomy, organ transplant, chronic steroids)    Negative: Wheezing is present    Negative: [1] Ankle swelling AND [2] swelling is increasing    Protocols used: COUGH -  ACUTE NON-PRODUCTIVE-A-AH

## 2022-08-10 ENCOUNTER — APPOINTMENT (OUTPATIENT)
Dept: CT IMAGING | Facility: HOSPITAL | Age: 37
End: 2022-08-10
Attending: EMERGENCY MEDICINE
Payer: COMMERCIAL

## 2022-08-10 ENCOUNTER — HOSPITAL ENCOUNTER (EMERGENCY)
Facility: HOSPITAL | Age: 37
Discharge: HOME | End: 2022-08-10
Attending: EMERGENCY MEDICINE | Admitting: EMERGENCY MEDICINE
Payer: COMMERCIAL

## 2022-08-10 VITALS
OXYGEN SATURATION: 97 % | SYSTOLIC BLOOD PRESSURE: 106 MMHG | DIASTOLIC BLOOD PRESSURE: 64 MMHG | RESPIRATION RATE: 20 BRPM | WEIGHT: 165 LBS | BODY MASS INDEX: 31.18 KG/M2 | HEART RATE: 81 BPM | TEMPERATURE: 98.3 F

## 2022-08-10 DIAGNOSIS — R10.30 LOWER ABDOMINAL PAIN: ICD-10-CM

## 2022-08-10 LAB
ALBUMIN SERPL-MCNC: 3.7 G/DL (ref 3.5–5)
ALBUMIN UR-MCNC: NEGATIVE MG/DL
ALP SERPL-CCNC: 80 U/L (ref 45–120)
ALT SERPL W P-5'-P-CCNC: 12 U/L (ref 0–45)
ANION GAP SERPL CALCULATED.3IONS-SCNC: 7 MMOL/L (ref 5–18)
APPEARANCE UR: ABNORMAL
AST SERPL W P-5'-P-CCNC: 12 U/L (ref 0–40)
BACTERIA #/AREA URNS HPF: ABNORMAL /HPF
BILIRUB DIRECT SERPL-MCNC: 0.1 MG/DL
BILIRUB SERPL-MCNC: 0.3 MG/DL (ref 0–1)
BILIRUB UR QL STRIP: NEGATIVE
BUN SERPL-MCNC: 11 MG/DL (ref 8–22)
CALCIUM SERPL-MCNC: 9.2 MG/DL (ref 8.5–10.5)
CHLORIDE BLD-SCNC: 104 MMOL/L (ref 98–107)
CO2 SERPL-SCNC: 27 MMOL/L (ref 22–31)
COLOR UR AUTO: ABNORMAL
CREAT SERPL-MCNC: 1.15 MG/DL (ref 0.6–1.1)
ERYTHROCYTE [DISTWIDTH] IN BLOOD BY AUTOMATED COUNT: 13.2 % (ref 10–15)
GFR SERPL CREATININE-BSD FRML MDRD: 62 ML/MIN/1.73M2
GLUCOSE BLD-MCNC: 89 MG/DL (ref 70–125)
GLUCOSE UR STRIP-MCNC: NEGATIVE MG/DL
HCT VFR BLD AUTO: 44.6 % (ref 35–47)
HGB BLD-MCNC: 14.9 G/DL (ref 11.7–15.7)
HGB UR QL STRIP: NEGATIVE
KETONES UR STRIP-MCNC: NEGATIVE MG/DL
LEUKOCYTE ESTERASE UR QL STRIP: NEGATIVE
LIPASE SERPL-CCNC: 45 U/L (ref 0–52)
MCH RBC QN AUTO: 30.2 PG (ref 26.5–33)
MCHC RBC AUTO-ENTMCNC: 33.4 G/DL (ref 31.5–36.5)
MCV RBC AUTO: 90 FL (ref 78–100)
NITRATE UR QL: NEGATIVE
PH UR STRIP: 8 [PH] (ref 5–7)
PLATELET # BLD AUTO: 383 10E3/UL (ref 150–450)
POTASSIUM BLD-SCNC: 3.8 MMOL/L (ref 3.5–5)
PROT SERPL-MCNC: 7 G/DL (ref 6–8)
RBC # BLD AUTO: 4.94 10E6/UL (ref 3.8–5.2)
RBC URINE: 1 /HPF
SODIUM SERPL-SCNC: 138 MMOL/L (ref 136–145)
SP GR UR STRIP: 1.04 (ref 1–1.03)
SQUAMOUS EPITHELIAL: 8 /HPF
UROBILINOGEN UR STRIP-MCNC: <2 MG/DL
WBC # BLD AUTO: 12.9 10E3/UL (ref 4–11)
WBC URINE: 1 /HPF

## 2022-08-10 PROCEDURE — 96374 THER/PROPH/DIAG INJ IV PUSH: CPT | Mod: 59

## 2022-08-10 PROCEDURE — 83690 ASSAY OF LIPASE: CPT | Performed by: STUDENT IN AN ORGANIZED HEALTH CARE EDUCATION/TRAINING PROGRAM

## 2022-08-10 PROCEDURE — 250N000011 HC RX IP 250 OP 636: Performed by: EMERGENCY MEDICINE

## 2022-08-10 PROCEDURE — 81001 URINALYSIS AUTO W/SCOPE: CPT | Performed by: EMERGENCY MEDICINE

## 2022-08-10 PROCEDURE — 258N000003 HC RX IP 258 OP 636: Performed by: EMERGENCY MEDICINE

## 2022-08-10 PROCEDURE — 96375 TX/PRO/DX INJ NEW DRUG ADDON: CPT

## 2022-08-10 PROCEDURE — 74177 CT ABD & PELVIS W/CONTRAST: CPT

## 2022-08-10 PROCEDURE — 99285 EMERGENCY DEPT VISIT HI MDM: CPT | Mod: 25

## 2022-08-10 PROCEDURE — 96361 HYDRATE IV INFUSION ADD-ON: CPT

## 2022-08-10 PROCEDURE — 85027 COMPLETE CBC AUTOMATED: CPT | Performed by: STUDENT IN AN ORGANIZED HEALTH CARE EDUCATION/TRAINING PROGRAM

## 2022-08-10 PROCEDURE — 82248 BILIRUBIN DIRECT: CPT | Performed by: STUDENT IN AN ORGANIZED HEALTH CARE EDUCATION/TRAINING PROGRAM

## 2022-08-10 PROCEDURE — 80053 COMPREHEN METABOLIC PANEL: CPT | Performed by: STUDENT IN AN ORGANIZED HEALTH CARE EDUCATION/TRAINING PROGRAM

## 2022-08-10 PROCEDURE — 36415 COLL VENOUS BLD VENIPUNCTURE: CPT | Performed by: STUDENT IN AN ORGANIZED HEALTH CARE EDUCATION/TRAINING PROGRAM

## 2022-08-10 RX ORDER — ONDANSETRON 2 MG/ML
4 INJECTION INTRAMUSCULAR; INTRAVENOUS ONCE
Status: COMPLETED | OUTPATIENT
Start: 2022-08-10 | End: 2022-08-10

## 2022-08-10 RX ORDER — MORPHINE SULFATE 4 MG/ML
4 INJECTION, SOLUTION INTRAMUSCULAR; INTRAVENOUS ONCE
Status: COMPLETED | OUTPATIENT
Start: 2022-08-10 | End: 2022-08-10

## 2022-08-10 RX ORDER — IOPAMIDOL 755 MG/ML
75 INJECTION, SOLUTION INTRAVASCULAR ONCE
Status: COMPLETED | OUTPATIENT
Start: 2022-08-10 | End: 2022-08-10

## 2022-08-10 RX ADMIN — SODIUM CHLORIDE 1000 ML: 9 INJECTION, SOLUTION INTRAVENOUS at 20:45

## 2022-08-10 RX ADMIN — IOPAMIDOL 75 ML: 755 INJECTION, SOLUTION INTRAVENOUS at 22:14

## 2022-08-10 RX ADMIN — MORPHINE SULFATE 4 MG: 4 INJECTION, SOLUTION INTRAMUSCULAR; INTRAVENOUS at 20:45

## 2022-08-10 RX ADMIN — ONDANSETRON 4 MG: 2 INJECTION INTRAMUSCULAR; INTRAVENOUS at 20:48

## 2022-08-10 ASSESSMENT — ENCOUNTER SYMPTOMS
FEVER: 0
DYSURIA: 0
BLOOD IN STOOL: 0
ABDOMINAL PAIN: 1

## 2022-08-10 ASSESSMENT — ACTIVITIES OF DAILY LIVING (ADL)
ADLS_ACUITY_SCORE: 35
ADLS_ACUITY_SCORE: 35

## 2022-08-11 NOTE — ED NOTES
"EMERGENCY DEPARTMENT SIGN OUT NOTE        ED COURSE AND MEDICAL DECISION MAKING  Patient was signed out to me by Dr Nuvia Avina at 10:22 PM.    In brief, Dayana Mcclain is a 36 year old female who initially presented for the evaluation of abdominal pain. Since last night the patient has had consistent abdominal pain which she describes as \"tingly\" that is provoked by \"pushing\" when trying to defecate. She is nauseous, dizzy, having diarrhea, and chills. No recent periods, as she had a partial hysterectomy about 6 years ago. No vomiting. History of ovarian cysts. In addition, she has a history of embedded kidney stones, which she said caused similar pain. History of diverticulosis. Went to the [facility] and got a CT that reportedly found nothing and was sent home.      At time of sign out, disposition was pending CT Abdomen Pelvis and UA.    CT scan here unremarkable, urinalysis without evidence of infection.  Discharge at this time.    FINAL IMPRESSION    1. Lower abdominal pain        ED MEDS  Medications   morphine (PF) injection 4 mg (4 mg Intravenous Given 8/10/22 2045)   ondansetron (ZOFRAN) injection 4 mg (4 mg Intravenous Given 8/10/22 2048)   0.9% sodium chloride BOLUS (1,000 mLs Intravenous New Bag 8/10/22 2045)   iopamidol (ISOVUE-370) solution 75 mL (75 mLs Intravenous Given 8/10/22 2214)       LAB  Labs Ordered and Resulted from Time of ED Arrival to Time of ED Departure   CBC WITH PLATELETS - Abnormal       Result Value    WBC Count 12.9 (*)     RBC Count 4.94      Hemoglobin 14.9      Hematocrit 44.6      MCV 90      MCH 30.2      MCHC 33.4      RDW 13.2      Platelet Count 383     BASIC METABOLIC PANEL - Abnormal    Sodium 138      Potassium 3.8      Chloride 104      Carbon Dioxide (CO2) 27      Anion Gap 7      Urea Nitrogen 11      Creatinine 1.15 (*)     Calcium 9.2      Glucose 89      GFR Estimate 62     HEPATIC FUNCTION PANEL - Normal    Bilirubin Total 0.3      Bilirubin Direct 0.1   "    Protein Total 7.0      Albumin 3.7      Alkaline Phosphatase 80      AST 12      ALT 12     LIPASE - Normal    Lipase 45     ROUTINE UA WITH MICROSCOPIC REFLEX TO CULTURE         RADIOLOGY    CT Abdomen Pelvis w Contrast    (Results Pending)       DISCHARGE MEDS  New Prescriptions    No medications on file       I, Dalila Rodriguez, am serving as a scribe to document services personally performed by Dr. Boothe, based on my observations and the provider's statements to me.  I, Dr. Boothe, attest that Dalila Rodriguez is acting in a scribe capacity, has observed my performance of the services and has documented them in accordance with my direction.      Natacha Boothe MD  [facility] EMERGENCY DEPARTMENT  [facility] [address] [phone]     Natacha Boothe MD  08/10/22 2370

## 2022-08-11 NOTE — ED NOTES
Pt reports that her last bm was this morning. It was diarrhea. No blood in the stool. She reports dizziness but has not fallen or passed out.

## 2022-08-11 NOTE — DISCHARGE INSTRUCTIONS
You were seen in the Emergency Department today for evaluation of lower abdominal pain.  Your lab work showed no cause of your symptoms. Your imaging studies showed no significant cause of your symptoms. Follow up with your primary care physician to ensure resolution of symptoms. Return if you have new or worsening symptoms.

## 2022-08-11 NOTE — ED TRIAGE NOTES
Patient presents here with abdominal pain that has occurred over the past 24 hour. No vomiting or diarrhea. She is locating her pain to the lower midline area. She does have chronic soft stools following her cholecystectomy. She denies fevers. Hx of diverticulosis.

## 2022-08-11 NOTE — ED PROVIDER NOTES
EMERGENCY DEPARTMENT ENCOUNTER      NAME: Dayana Mcclain  AGE: 36 year old female  YOB: 1985  MRN: [id number]  EVALUATION DATE & TIME: 8/10/2022  8:16 PM    PCP: No Ref-Primary, Physician    ED PROVIDER: Nuvia Avina M.D.      Chief Complaint   Patient presents with     Abdominal Pain     FINAL IMPRESSION:  1. Lower abdominal pain      ED COURSE & MEDICAL DECISION MAKING:    Pertinent Labs & Imaging studies reviewed. (See chart for details)  ED Course as of 08/10/22 2248   Wed Aug 10, 2022   2034 Patient is a 36-year-old female who comes in today for evaluation of lower abdominal pain.  The symptoms started last night she was seen yesterday had CT imaging that was reportedly unremarkable.  She reports that symptoms are much worse this evening.  She is a history of kidney stones and diverticulitis and she has had ovarian cyst before.  She appeared uncomfortable.  White count is slightly elevated but the rest of her labs look okay.  She is hemodynamically stable.  We talked about repeating the CT scan since it was early on in the course yesterday and her symptoms are worse and she would like to do that.  We will give her some morphine and some Zofran and some IV fluids.  We will check a urine as well.  She has had a partial hysterectomy so she is not pregnant.  I discussed all this with her and she is in agreement with the plan.   2219 Patient's lab work came back pretty unremarkable.  White count was slightly elevated but CT scan was obtained.  Urine is still pending.  Patient will be signed out at change of shift pending CT scan and urinalysis and likely discharge home.   2242 Patient scan just came back and I updated her on the results.  We are just waiting on her urine so she will be discharged either with antibiotics or without.       8:24 PM I met with the patient, obtained history, performed an initial exam, and discussed options and plan for diagnostics and treatment here in the  "ED.    PPE: Provider wore gloves, N95 mask, surgical cap.    At the conclusion of the encounter I discussed  the results of all of the tests and the disposition with patient.   All questions were answered.  The patient acknowledged understanding and was involved in the decision making regarding the overall care plan.      I discussed with patient the utility, limitations and findings of the exam/interventions/studies done during this visit as well as the list of differential diagnosis and symptoms to monitor/return to ER for.  Additional verbal discharge instructions were provided.     MEDICATIONS GIVEN IN THE EMERGENCY:  Medications   morphine (PF) injection 4 mg (4 mg Intravenous Given 8/10/22 2045)   ondansetron (ZOFRAN) injection 4 mg (4 mg Intravenous Given 8/10/22 2048)   0.9% sodium chloride BOLUS (1,000 mLs Intravenous New Bag 8/10/22 2045)   iopamidol (ISOVUE-370) solution 75 mL (75 mLs Intravenous Given 8/10/22 2214)     =================================================================    HPI    Triage Note: Patient presents here with abdominal pain that has occurred over the past 24 hour. No vomiting or diarrhea. She is locating her pain to the lower midline area. She does have chronic soft stools following her cholecystectomy. She denies fevers. Hx of diverticulosis.    Patient information was obtained from: The patient     Use of : N/A       Dayana Mcclain is a 36 year old female who presents with abdominal pain.     Since last night the patient has had consistent abdominal pain which she describes as \"tingly\" that is provoked by \"pushing\" when trying to defecate. She is nauseous, dizzy, having diarrhea, and chills. No recent periods, as she had a partial hysterectomy about 6 years ago. No vomiting. History of ovarian cysts. In addition, she has a history of embedded kidney stones, which she said caused similar pain. History of diverticulosis.    Went to the [facility] and got a CT that " reportedly found nothing and was sent home.      REVIEW OF SYSTEMS   Except as stated in the HPI all other systems reviewed and are negative.    PAST MEDICAL HISTORY:  No past medical history on file.    PAST SURGICAL HISTORY:  Past Surgical History:   Procedure Laterality Date      SECTION       KIDNEY STONE SURGERY       LAPAROSCOPIC CYSTECTOMY OVARIAN (ONCOLOGY)         CURRENT MEDICATIONS:    No current facility-administered medications for this encounter.    Current Outpatient Medications:      EDWARD 0.1 MG/24HR bi-weekly patch, Place 1 patch onto the skin twice a week, Disp: 24 patch, Rfl: 0     estradiol (CLIMARA) 0.1 MG/24HR weekly patch, APPLY 1 PATCH TOPICALLY TO THE SKIN 1 TIME A WEEK, Disp: 4 patch, Rfl: 0     estradiol (VIVELLE-DOT) 0.1 MG/24HR bi-weekly patch, Place 1 patch onto the skin twice a week, Disp: 24 patch, Rfl: 0     fluconazole (DIFLUCAN) 150 MG tablet, Take 1 tablet by mouth and if symptoms do not resolve in 72 hrs take another tablet, Disp: 2 tablet, Rfl: 0     gabapentin (NEURONTIN) 100 MG capsule, Take 100 mg by mouth, Disp: , Rfl:      gabapentin (NEURONTIN) 300 MG capsule, Take 2 capsules (600 mg) by mouth At Bedtime, Disp: 60 capsule, Rfl: 1     ibuprofen (ADVIL/MOTRIN) 600 MG tablet, Take 600 mg by mouth, Disp: , Rfl:      methocarbamol (ROBAXIN) 500 MG tablet, Take 1-2 tablets (500-1,000 mg) by mouth 4 times daily as needed for muscle spasms, Disp: 60 tablet, Rfl: 1     nitroFURantoin macrocrystal-monohydrate (MACROBID) 100 MG capsule, Take 1 capsule (100 mg) by mouth 2 times daily, Disp: 10 capsule, Rfl: 0    ALLERGIES:  Allergies   Allergen Reactions     Vancomycin Itching     Complained of itching hands/arms after infusion completed.     Hydromorphone Itching     Amoxicillin Rash     Erythromycin Nausea and Vomiting and Rash     Vicodin [Hydrocodone-Acetaminophen] Rash       FAMILY HISTORY:  Family History   Problem Relation Age of Onset     No Known Problems  Mother      No Known Problems Father        SOCIAL HISTORY:   Social History     Socioeconomic History     Marital status: Single   Tobacco Use     Smoking status: Former Smoker     Packs/day: 0.10     Years: 10.00     Pack years: 1.00     Smokeless tobacco: Never Used     Tobacco comment: 3 months ago quit   Vaping Use     Vaping Use: Never used   Substance and Sexual Activity     Alcohol use: No     Drug use: No     Sexual activity: Yes     Partners: Male       PHYSICAL EXAM    VITAL SIGNS: /66   Pulse 89   Temp 98.3  F (36.8  C) (Oral)   Resp 16   Wt 74.8 kg (165 lb)   SpO2 97%   BMI 31.18 kg/m     GENERAL: Awake, Alert, answering questions, Appearing uncomfortable, Well nourished  HEENT: Normal cephalic, Atraumatic, bilateral external ears normal, No scleral icterus, mask in place  NECK: No obvious swelling or abnormality, No stridor  PULMONARY:Normal and symmetric breath sounds, No respiratory distress, Lungs clear to auscultation bilaterally. No wheezing  CARDIOVASCULAR: Regular rate and rhythm, Distal pulses present and normal.  ABDOMINAL: Soft, Nondistended, Lower abdominal tenderness, No flank tenderness, No palpable masses  BACK: No tenderness.  EXTREMITIES: Moves all extremities spontaneously, warm, no edema, No major deformities  NEURO: No facial droop, normal motor function, Normal speech   PSYCH: Normal mood and affect  SKIN: No rashes on visualized skin, dry, warm     LAB:  All pertinent labs reviewed and interpreted.  Results for orders placed or performed during the hospital encounter of 08/10/22   CT Abdomen Pelvis w Contrast    Impression    IMPRESSION:   1.  No acute abnormality in the abdomen or pelvis.  2.  Moderate sigmoid diverticulosis. No evidence for acute diverticulitis.   CBC (+ platelets, no diff)   Result Value Ref Range    WBC Count 12.9 (H) 4.0 - 11.0 10e3/uL    RBC Count 4.94 3.80 - 5.20 10e6/uL    Hemoglobin 14.9 11.7 - 15.7 g/dL    Hematocrit 44.6 35.0 - 47.0 %    MCV  90 78 - 100 fL    MCH 30.2 26.5 - 33.0 pg    MCHC 33.4 31.5 - 36.5 g/dL    RDW 13.2 10.0 - 15.0 %    Platelet Count 383 150 - 450 10e3/uL   Basic metabolic panel   Result Value Ref Range    Sodium 138 136 - 145 mmol/L    Potassium 3.8 3.5 - 5.0 mmol/L    Chloride 104 98 - 107 mmol/L    Carbon Dioxide (CO2) 27 22 - 31 mmol/L    Anion Gap 7 5 - 18 mmol/L    Urea Nitrogen 11 8 - 22 mg/dL    Creatinine 1.15 (H) 0.60 - 1.10 mg/dL    Calcium 9.2 8.5 - 10.5 mg/dL    Glucose 89 70 - 125 mg/dL    GFR Estimate 62 >60 mL/min/1.73m2   Hepatic function panel   Result Value Ref Range    Bilirubin Total 0.3 0.0 - 1.0 mg/dL    Bilirubin Direct 0.1 <=0.5 mg/dL    Protein Total 7.0 6.0 - 8.0 g/dL    Albumin 3.7 3.5 - 5.0 g/dL    Alkaline Phosphatase 80 45 - 120 U/L    AST 12 0 - 40 U/L    ALT 12 0 - 45 U/L   Result Value Ref Range    Lipase 45 0 - 52 U/L   UA with Microscopic reflex to Culture    Specimen: Urine, Midstream   Result Value Ref Range    Color Urine Light Yellow Colorless, Straw, Light Yellow, Yellow    Appearance Urine Turbid (A) Clear    Glucose Urine Negative Negative mg/dL    Bilirubin Urine Negative Negative    Ketones Urine Negative Negative mg/dL    Specific Gravity Urine 1.036 (H) 1.001 - 1.030    Blood Urine Negative Negative    pH Urine 8.0 (H) 5.0 - 7.0    Protein Albumin Urine Negative Negative mg/dL    Urobilinogen Urine <2.0 <2.0 mg/dL    Nitrite Urine Negative Negative    Leukocyte Esterase Urine Negative Negative    Bacteria Urine Few (A) None Seen /HPF    RBC Urine 1 <=2 /HPF    WBC Urine 1 <=5 /HPF    Squamous Epithelials Urine 8 (H) <=1 /HPF       RADIOLOGY:  CT Abdomen Pelvis w Contrast   Final Result   IMPRESSION:    1.  No acute abnormality in the abdomen or pelvis.   2.  Moderate sigmoid diverticulosis. No evidence for acute diverticulitis.            I, Sneha Gotti, am serving as a scribe to document services personally performed by Dr. Avina based on my observation and the provider's  statements to me. I, Nuvia Avina MD attest that Sneha Ana María is acting in a scribe capacity, has observed my performance of the services and has documented them in accordance with my direction.    Nuvia Avina M.D.  Emergency Medicine  [facility] EMERGENCY DEPARTMENT  [address] [place] [id number]  [phone]  Dept: [phone]     Nuvia Avina MD  08/10/22 2223       Nuvia Avina MD  08/10/22 1970

## 2022-08-11 NOTE — ED NOTES
ED Triage Provider Note  [facility]  Encounter Date: Aug 10, 2022    History:  No chief complaint on file.    Dayana Mcclain is a 36 year old female who presents to the ED with abdominal pain. Covid19 positive 8/4/22. Developed abdominal pain is constant and throughout the lower abdomen but spreads upwards when she lays down. No bloody stool. Stool looser than normal. No dysuria, no abnormal vaginal discharge. Seen in urgency room yesterday for this. H/o hysterectomy so no concerns for pregnancy.    Review of Systems:  Review of Systems   Constitutional: Negative for fever.   Gastrointestinal: Positive for abdominal pain. Negative for blood in stool.   Genitourinary: Negative for dysuria and vaginal discharge.        Exam:  There were no vitals taken for this visit.  General: No acute distress. Appears stated age.   Cardio: Regular rate, extremities well perfused  Resp: Normal work of breathing, grossly normal respiratory rate  Neuro: Alert. CN II-XII grossly intact. Grossly intact strength.   Skin: warm, dry    Medical Decision Making:  Patient arriving to the ED with problem as above. A medical screening exam was performed. Plan for IV, labs UA. The patient is appropriate to wait in triage.    Interventions:  Orders Placed This Encounter     CBC (+ platelets, no diff)     Basic metabolic panel     Hepatic function panel     Lipase     Diagnosis:  Abdominal pain         Quita Quigley MD  08/10/22 1907

## 2022-10-12 ENCOUNTER — LAB REQUISITION (OUTPATIENT)
Dept: LAB | Facility: CLINIC | Age: 37
End: 2022-10-12
Payer: COMMERCIAL

## 2022-10-12 DIAGNOSIS — M25.579 PAIN IN UNSPECIFIED ANKLE AND JOINTS OF UNSPECIFIED FOOT: ICD-10-CM

## 2022-10-12 DIAGNOSIS — M25.473 EFFUSION, UNSPECIFIED ANKLE: ICD-10-CM

## 2022-10-12 LAB
APPEARANCE FLD: ABNORMAL
CELL COUNT BODY FLUID SOURCE: ABNORMAL
COLOR FLD: ABNORMAL
CRYSTALS SNV MICRO: NORMAL
GRAM STAIN RESULT: NORMAL
GRAM STAIN RESULT: NORMAL
LYMPHOCYTES NFR FLD MANUAL: 6 %
MONOS+MACROS NFR FLD MANUAL: 5 %
NEUTS BAND NFR FLD MANUAL: 90 %
WBC # FLD AUTO: 3150 /UL

## 2022-10-12 PROCEDURE — 89060 EXAM SYNOVIAL FLUID CRYSTALS: CPT | Mod: ORL | Performed by: STUDENT IN AN ORGANIZED HEALTH CARE EDUCATION/TRAINING PROGRAM

## 2022-10-12 PROCEDURE — 87070 CULTURE OTHR SPECIMN AEROBIC: CPT | Mod: ORL | Performed by: STUDENT IN AN ORGANIZED HEALTH CARE EDUCATION/TRAINING PROGRAM

## 2022-10-12 PROCEDURE — 87075 CULTR BACTERIA EXCEPT BLOOD: CPT | Mod: ORL | Performed by: STUDENT IN AN ORGANIZED HEALTH CARE EDUCATION/TRAINING PROGRAM

## 2022-10-12 PROCEDURE — 89051 BODY FLUID CELL COUNT: CPT | Mod: ORL | Performed by: STUDENT IN AN ORGANIZED HEALTH CARE EDUCATION/TRAINING PROGRAM

## 2022-10-12 PROCEDURE — 87102 FUNGUS ISOLATION CULTURE: CPT | Mod: ORL | Performed by: STUDENT IN AN ORGANIZED HEALTH CARE EDUCATION/TRAINING PROGRAM

## 2022-10-12 PROCEDURE — 87205 SMEAR GRAM STAIN: CPT | Mod: ORL | Performed by: STUDENT IN AN ORGANIZED HEALTH CARE EDUCATION/TRAINING PROGRAM

## 2022-10-17 LAB — BACTERIA SNV CULT: NO GROWTH

## 2022-10-26 LAB — BACTERIA SNV CULT: NORMAL

## 2022-11-09 LAB — BACTERIA SNV CULT: NO GROWTH

## 2022-12-19 ENCOUNTER — OFFICE VISIT (OUTPATIENT)
Dept: PODIATRY | Facility: CLINIC | Age: 37
End: 2022-12-19
Payer: COMMERCIAL

## 2022-12-19 VITALS — OXYGEN SATURATION: 98 % | HEART RATE: 70 BPM

## 2022-12-19 DIAGNOSIS — M76.822 TIBIALIS POSTERIOR TENDINITIS, LEFT: ICD-10-CM

## 2022-12-19 DIAGNOSIS — M65.972 SYNOVITIS OF LEFT ANKLE: Primary | ICD-10-CM

## 2022-12-19 DIAGNOSIS — M21.42 ACQUIRED PES PLANOVALGUS, LEFT: ICD-10-CM

## 2022-12-19 PROCEDURE — 99203 OFFICE O/P NEW LOW 30 MIN: CPT | Performed by: PODIATRIST

## 2022-12-19 ASSESSMENT — PAIN SCALES - GENERAL: PAINLEVEL: MODERATE PAIN (4)

## 2022-12-19 NOTE — LETTER
12/19/2022         RE: Dayana Mcclain  [address] [address]        Dear Colleague,    Thank you for referring your patient, Dayana Mcclain, to the [facility]. Please see a copy of my visit note below.          FOOT AND ANKLE SURGERY/PODIATRY CONSULT NOTE         ASSESSMENT:   Synovitis left ankle  Tibialis Posterior Tendinitis left  Pes Planovalgus left       TREATMENT:  -I reviewed the patient's most recent MRI report which indicates synovitis of the left ankle, no additional significant pathology noted.     -There is pain along the course of the tibialis posterior tendon on the left foot which seems to be the area of patient's greatest discomfort. We discussed etiology and treatment options.     -I have referred her to physical therapy and recommend she resume use of custom inserts.     -Patient's questions invited and answered. Patient to return to clinic in 6 week(s) for re-evaluation if symptoms persist. She was encouraged to call my office with any further questions or concerns.     Devante Rodriguez DPM  [facility] Podiatry/Foot & Ankle Surgery      HPI: I was asked to see Dayana Mcclain today for left ankle pain. The patient states she has had pain for several years, and has seen multiple providers at [facility]. She admits undergoing left ankle surgery several years ago, not sure on the exact timeline, while living in [place]. She has been walking in a CAM boot and states that she is unable to tolerate walking in regular shoes. MRI report reviewed from earlier this year indicated synovitis of the left ankle, no other significant pathology noted.     No past medical history on file.      Social History     Socioeconomic History     Marital status: Single     Spouse name: Not on file     Number of children: Not on file     Years of education: Not on file     Highest education level: Not on file   Occupational History     Not on file    Tobacco Use     Smoking status: Former     Packs/day: 0.10     Years: 10.00     Pack years: 1.00     Types: Cigarettes     Smokeless tobacco: Never     Tobacco comments:     3 months ago quit   Vaping Use     Vaping Use: Never used   Substance and Sexual Activity     Alcohol use: No     Drug use: No     Sexual activity: Yes     Partners: Male   Other Topics Concern     Not on file   Social History Narrative     Not on file     Social Determinants of Health     Financial Resource Strain: Not on file   Food Insecurity: Not on file   Transportation Needs: Not on file   Physical Activity: Not on file   Stress: Not on file   Social Connections: Not on file   Intimate Partner Violence: Not on file   Housing Stability: Not on file            Allergies   Allergen Reactions     Vancomycin Itching     Complained of itching hands/arms after infusion completed.     Hydrocodone Hives and Nausea     Hydromorphone Itching     Amoxicillin Rash     Erythromycin Nausea and Vomiting and Rash     Vicodin [Hydrocodone-Acetaminophen] Rash         MEDICATIONS:   Current Outpatient Medications   Medication     EDWARD 0.1 MG/24HR bi-weekly patch     estradiol (CLIMARA) 0.1 MG/24HR weekly patch     estradiol (VIVELLE-DOT) 0.1 MG/24HR bi-weekly patch     fluconazole (DIFLUCAN) 150 MG tablet     gabapentin (NEURONTIN) 100 MG capsule     gabapentin (NEURONTIN) 300 MG capsule     ibuprofen (ADVIL/MOTRIN) 600 MG tablet     methocarbamol (ROBAXIN) 500 MG tablet     nitroFURantoin macrocrystal-monohydrate (MACROBID) 100 MG capsule     No current facility-administered medications for this visit.        Family History   Problem Relation Age of Onset     No Known Problems Mother      No Known Problems Father           Review of Systems - 10 point Review of Systems is negative except for left ankle pain which is noted in HPI.    OBJECTIVE:  Appearance: alert, well appearing, and in no distress.    VITAL SIGNS: Pulse 70   SpO2 98%       General  appearance: Patient is alert and fully cooperative with history & exam.  No sign of distress is noted during the visit.     Psychiatric: Affect is pleasant & appropriate.  Patient appears motivated to improve health.     Respiratory: Breathing is regular & unlabored while sitting.     HEENT: Hearing is intact to spoken word.  Speech is clear.  No gross evidence of visual impairment that would impact ambulation.      Vascular: Dorsalis pedis and posterior tibial pulses are palpable. There is pedal hair growth left.  CFT < 3 sec from anterior tibial surface to distal digits left. There is no appreciable edema noted.  Dermatologic: Turgor and texture are within normal limits. No coloration or temperature changes. No primary or secondary lesions noted.  Neurologic: All epicritic and proprioceptive sensations are grossly intact left.  Musculoskeletal: Mild pain along anterior medial left ankle and along course of tibialis posterior tendon left. Decreased medial arch left foot. Available ROM left ankle and STJ, no crepitus.             Again, thank you for allowing me to participate in the care of your patient.        Sincerely,        Devante Rodriguez DPM

## 2022-12-19 NOTE — PROGRESS NOTES
FOOT AND ANKLE SURGERY/PODIATRY CONSULT NOTE         ASSESSMENT:   Synovitis left ankle  Tibialis Posterior Tendinitis left  Pes Planovalgus left       TREATMENT:  -I reviewed the patient's most recent MRI report which indicates synovitis of the left ankle, no additional significant pathology noted.     -There is pain along the course of the tibialis posterior tendon on the left foot which seems to be the area of patient's greatest discomfort. We discussed etiology and treatment options.     -I have referred her to physical therapy and recommend she resume use of custom inserts.     -Patient declines steroid injection into the left ankle.     -Patient's questions invited and answered. Patient to return to clinic in 6 week(s) for re-evaluation if symptoms persist. She was encouraged to call my office with any further questions or concerns.     Devante Rodriguez DPM  [facility] Podiatry/Foot & Ankle Surgery      HPI: I was asked to see Dayana Mcclain today for left ankle pain. The patient states she has had pain for several years, and has seen multiple providers at [facility]. She admits undergoing left ankle surgery several years ago, not sure on the exact timeline, while living in [place]. She has been walking in a CAM boot and states that she is unable to tolerate walking in regular shoes. MRI report reviewed from earlier this year indicated synovitis of the left ankle, no other significant pathology noted.     No past medical history on file.      Social History     Socioeconomic History     Marital status: Single     Spouse name: Not on file     Number of children: Not on file     Years of education: Not on file     Highest education level: Not on file   Occupational History     Not on file   Tobacco Use     Smoking status: Former     Packs/day: 0.10     Years: 10.00     Pack years: 1.00     Types: Cigarettes     Smokeless tobacco: Never     Tobacco comments:     3 months ago quit   Vaping Use      Vaping Use: Never used   Substance and Sexual Activity     Alcohol use: No     Drug use: No     Sexual activity: Yes     Partners: Male   Other Topics Concern     Not on file   Social History Narrative     Not on file     Social Determinants of Health     Financial Resource Strain: Not on file   Food Insecurity: Not on file   Transportation Needs: Not on file   Physical Activity: Not on file   Stress: Not on file   Social Connections: Not on file   Intimate Partner Violence: Not on file   Housing Stability: Not on file            Allergies   Allergen Reactions     Vancomycin Itching     Complained of itching hands/arms after infusion completed.     Hydrocodone Hives and Nausea     Hydromorphone Itching     Amoxicillin Rash     Erythromycin Nausea and Vomiting and Rash     Vicodin [Hydrocodone-Acetaminophen] Rash         MEDICATIONS:   Current Outpatient Medications   Medication     EDWARD 0.1 MG/24HR bi-weekly patch     estradiol (CLIMARA) 0.1 MG/24HR weekly patch     estradiol (VIVELLE-DOT) 0.1 MG/24HR bi-weekly patch     fluconazole (DIFLUCAN) 150 MG tablet     gabapentin (NEURONTIN) 100 MG capsule     gabapentin (NEURONTIN) 300 MG capsule     ibuprofen (ADVIL/MOTRIN) 600 MG tablet     methocarbamol (ROBAXIN) 500 MG tablet     nitroFURantoin macrocrystal-monohydrate (MACROBID) 100 MG capsule     No current facility-administered medications for this visit.        Family History   Problem Relation Age of Onset     No Known Problems Mother      No Known Problems Father           Review of Systems - 10 point Review of Systems is negative except for left ankle pain which is noted in HPI.    OBJECTIVE:  Appearance: alert, well appearing, and in no distress.    VITAL SIGNS: Pulse 70   SpO2 98%       General appearance: Patient is alert and fully cooperative with history & exam.  No sign of distress is noted during the visit.     Psychiatric: Affect is pleasant & appropriate.  Patient appears motivated to improve  health.     Respiratory: Breathing is regular & unlabored while sitting.     HEENT: Hearing is intact to spoken word.  Speech is clear.  No gross evidence of visual impairment that would impact ambulation.      Vascular: Dorsalis pedis and posterior tibial pulses are palpable. There is pedal hair growth left.  CFT < 3 sec from anterior tibial surface to distal digits left. There is no appreciable edema noted.  Dermatologic: Turgor and texture are within normal limits. No coloration or temperature changes. No primary or secondary lesions noted.  Neurologic: All epicritic and proprioceptive sensations are grossly intact left.  Musculoskeletal: Mild pain along anterior medial left ankle and along course of tibialis posterior tendon left. Decreased medial arch left foot. Available ROM left ankle and STJ, no crepitus.

## 2023-05-30 ENCOUNTER — HOSPITAL ENCOUNTER (EMERGENCY)
Facility: HOSPITAL | Age: 38
Discharge: HOME | End: 2023-05-30
Attending: EMERGENCY MEDICINE | Admitting: EMERGENCY MEDICINE
Payer: COMMERCIAL

## 2023-05-30 ENCOUNTER — APPOINTMENT (OUTPATIENT)
Dept: CT IMAGING | Facility: HOSPITAL | Age: 38
End: 2023-05-30
Attending: EMERGENCY MEDICINE
Payer: COMMERCIAL

## 2023-05-30 VITALS
SYSTOLIC BLOOD PRESSURE: 118 MMHG | TEMPERATURE: 97.1 F | RESPIRATION RATE: 18 BRPM | HEART RATE: 79 BPM | HEIGHT: 60 IN | WEIGHT: 175 LBS | DIASTOLIC BLOOD PRESSURE: 71 MMHG | OXYGEN SATURATION: 96 % | BODY MASS INDEX: 34.36 KG/M2

## 2023-05-30 DIAGNOSIS — R11.0 NAUSEA: ICD-10-CM

## 2023-05-30 DIAGNOSIS — G44.209 TENSION-TYPE HEADACHE, NOT INTRACTABLE, UNSPECIFIED CHRONICITY PATTERN: ICD-10-CM

## 2023-05-30 LAB
ALBUMIN SERPL BCG-MCNC: 3.9 G/DL (ref 3.5–5.2)
ALP SERPL-CCNC: 84 U/L (ref 35–104)
ALT SERPL W P-5'-P-CCNC: 18 U/L (ref 10–35)
ANION GAP SERPL CALCULATED.3IONS-SCNC: 13 MMOL/L (ref 7–15)
AST SERPL W P-5'-P-CCNC: 19 U/L (ref 10–35)
BASOPHILS # BLD AUTO: 0 10E3/UL (ref 0–0.2)
BASOPHILS NFR BLD AUTO: 0 %
BILIRUB DIRECT SERPL-MCNC: <0.2 MG/DL (ref 0–0.3)
BILIRUB SERPL-MCNC: 0.3 MG/DL
BUN SERPL-MCNC: 14.9 MG/DL (ref 6–20)
CALCIUM SERPL-MCNC: 9.4 MG/DL (ref 8.6–10)
CHLORIDE SERPL-SCNC: 108 MMOL/L (ref 98–107)
COHGB MFR BLD: 3.3 % (ref 0–1.5)
CREAT SERPL-MCNC: 0.63 MG/DL (ref 0.51–0.95)
DEPRECATED HCO3 PLAS-SCNC: 19 MMOL/L (ref 22–29)
EOSINOPHIL # BLD AUTO: 0 10E3/UL (ref 0–0.7)
EOSINOPHIL NFR BLD AUTO: 0 %
ERYTHROCYTE [DISTWIDTH] IN BLOOD BY AUTOMATED COUNT: 13.5 % (ref 10–15)
GFR SERPL CREATININE-BSD FRML MDRD: >90 ML/MIN/1.73M2
GLUCOSE SERPL-MCNC: 146 MG/DL (ref 70–99)
HCT VFR BLD AUTO: 43.4 % (ref 35–47)
HGB BLD-MCNC: 14.6 G/DL (ref 11.7–15.7)
IMM GRANULOCYTES # BLD: 0.1 10E3/UL
IMM GRANULOCYTES NFR BLD: 1 %
LIPASE SERPL-CCNC: 20 U/L (ref 13–60)
LYMPHOCYTES # BLD AUTO: 2 10E3/UL (ref 0.8–5.3)
LYMPHOCYTES NFR BLD AUTO: 14 %
MAGNESIUM SERPL-MCNC: 1.8 MG/DL (ref 1.7–2.3)
MCH RBC QN AUTO: 29.3 PG (ref 26.5–33)
MCHC RBC AUTO-ENTMCNC: 33.6 G/DL (ref 31.5–36.5)
MCV RBC AUTO: 87 FL (ref 78–100)
MONOCYTES # BLD AUTO: 0.1 10E3/UL (ref 0–1.3)
MONOCYTES NFR BLD AUTO: 1 %
NEUTROPHILS # BLD AUTO: 11.9 10E3/UL (ref 1.6–8.3)
NEUTROPHILS NFR BLD AUTO: 84 %
NRBC # BLD AUTO: 0 10E3/UL
NRBC BLD AUTO-RTO: 0 /100
PLATELET # BLD AUTO: 414 10E3/UL (ref 150–450)
POTASSIUM SERPL-SCNC: 3.9 MMOL/L (ref 3.4–5.3)
PROT SERPL-MCNC: 6.9 G/DL (ref 6.4–8.3)
RBC # BLD AUTO: 4.99 10E6/UL (ref 3.8–5.2)
SODIUM SERPL-SCNC: 140 MMOL/L (ref 136–145)
WBC # BLD AUTO: 14.1 10E3/UL (ref 4–11)

## 2023-05-30 PROCEDURE — 85025 COMPLETE CBC W/AUTO DIFF WBC: CPT | Performed by: EMERGENCY MEDICINE

## 2023-05-30 PROCEDURE — 83690 ASSAY OF LIPASE: CPT | Performed by: EMERGENCY MEDICINE

## 2023-05-30 PROCEDURE — 82248 BILIRUBIN DIRECT: CPT | Performed by: EMERGENCY MEDICINE

## 2023-05-30 PROCEDURE — 96374 THER/PROPH/DIAG INJ IV PUSH: CPT | Mod: 59

## 2023-05-30 PROCEDURE — 250N000011 HC RX IP 250 OP 636: Performed by: EMERGENCY MEDICINE

## 2023-05-30 PROCEDURE — 96361 HYDRATE IV INFUSION ADD-ON: CPT

## 2023-05-30 PROCEDURE — 96375 TX/PRO/DX INJ NEW DRUG ADDON: CPT

## 2023-05-30 PROCEDURE — 83735 ASSAY OF MAGNESIUM: CPT | Performed by: EMERGENCY MEDICINE

## 2023-05-30 PROCEDURE — 36415 COLL VENOUS BLD VENIPUNCTURE: CPT | Performed by: EMERGENCY MEDICINE

## 2023-05-30 PROCEDURE — 99285 EMERGENCY DEPT VISIT HI MDM: CPT | Mod: 25

## 2023-05-30 PROCEDURE — 258N000003 HC RX IP 258 OP 636: Performed by: EMERGENCY MEDICINE

## 2023-05-30 PROCEDURE — 82375 ASSAY CARBOXYHB QUANT: CPT | Performed by: EMERGENCY MEDICINE

## 2023-05-30 PROCEDURE — 74177 CT ABD & PELVIS W/CONTRAST: CPT

## 2023-05-30 RX ORDER — DIPHENHYDRAMINE HYDROCHLORIDE 50 MG/ML
25 INJECTION INTRAMUSCULAR; INTRAVENOUS ONCE
Status: COMPLETED | OUTPATIENT
Start: 2023-05-30 | End: 2023-05-30

## 2023-05-30 RX ORDER — KETOROLAC TROMETHAMINE 15 MG/ML
15 INJECTION, SOLUTION INTRAMUSCULAR; INTRAVENOUS ONCE
Status: COMPLETED | OUTPATIENT
Start: 2023-05-30 | End: 2023-05-30

## 2023-05-30 RX ORDER — IOPAMIDOL 755 MG/ML
90 INJECTION, SOLUTION INTRAVASCULAR ONCE
Status: COMPLETED | OUTPATIENT
Start: 2023-05-30 | End: 2023-05-30

## 2023-05-30 RX ORDER — METOCLOPRAMIDE 10 MG/1
10 TABLET ORAL 4 TIMES DAILY PRN
Qty: 24 TABLET | Refills: 0 | Status: SHIPPED | OUTPATIENT
Start: 2023-05-30 | End: 2023-08-10

## 2023-05-30 RX ORDER — MORPHINE SULFATE 4 MG/ML
4 INJECTION, SOLUTION INTRAMUSCULAR; INTRAVENOUS ONCE
Status: COMPLETED | OUTPATIENT
Start: 2023-05-30 | End: 2023-05-30

## 2023-05-30 RX ORDER — ONDANSETRON 2 MG/ML
4 INJECTION INTRAMUSCULAR; INTRAVENOUS ONCE
Status: COMPLETED | OUTPATIENT
Start: 2023-05-30 | End: 2023-05-30

## 2023-05-30 RX ADMIN — DIPHENHYDRAMINE HYDROCHLORIDE 25 MG: 50 INJECTION, SOLUTION INTRAMUSCULAR; INTRAVENOUS at 07:29

## 2023-05-30 RX ADMIN — KETOROLAC TROMETHAMINE 15 MG: 15 INJECTION, SOLUTION INTRAMUSCULAR; INTRAVENOUS at 07:30

## 2023-05-30 RX ADMIN — ONDANSETRON 4 MG: 2 INJECTION INTRAMUSCULAR; INTRAVENOUS at 08:22

## 2023-05-30 RX ADMIN — PROCHLORPERAZINE EDISYLATE 10 MG: 5 INJECTION, SOLUTION INTRAMUSCULAR; INTRAVENOUS at 07:29

## 2023-05-30 RX ADMIN — IOPAMIDOL 90 ML: 755 INJECTION, SOLUTION INTRAVENOUS at 08:14

## 2023-05-30 RX ADMIN — MORPHINE SULFATE 4 MG: 4 INJECTION, SOLUTION INTRAMUSCULAR; INTRAVENOUS at 08:29

## 2023-05-30 RX ADMIN — SODIUM CHLORIDE 1000 ML: 9 INJECTION, SOLUTION INTRAVENOUS at 07:29

## 2023-05-30 ASSESSMENT — ACTIVITIES OF DAILY LIVING (ADL)
ADLS_ACUITY_SCORE: 35
ADLS_ACUITY_SCORE: 33

## 2023-05-30 NOTE — DISCHARGE INSTRUCTIONS
Take reglan for nausea/headache.  Drink plenty of fluids. Rest in dark room today. Follow up with primary clinic.

## 2023-05-30 NOTE — ED NOTES
Pt states that headache has not decrease since med administration. Still endorses 8/10 head pain,

## 2023-05-30 NOTE — Clinical Note
Dayana Mcclain was seen and treated in our emergency department on 5/30/2023.  She may return to work on 06/08/2023.       If you have any questions or concerns, please don't hesitate to call.      Marv Iqbal MD

## 2023-05-30 NOTE — ED PROVIDER NOTES
Emergency Department Encounter     Evaluation Date & Time:   5/30/2023  6:52 AM    CHIEF COMPLAINT:  Headache      Triage Note:  Bad headaches for 2 weeks. [facility] and received meds and fluids which improved headache pain. At 3AM headache pain of 10 returned. VSS, A&O, neuros intact. She also complains of ABD pain. HX of diverticulitis. She took Tylenol at 3AM, 2 tabs.             ED COURSE & MEDICAL DECISION MAKING:     ED Course as of 05/30/23 0933   Tue May 30, 2023   0808 CO level 3.3, but in the setting of smoking history.  WBC 14, nonspecific. Rest of labs overall reassuring. Awaiting CT results.   0823 Lipase and LFTs wnl.     0825 Pt reports no improvement with pain.  Will try different pain medication.   0834 CT abd/pelvis neg for any sort of acute pathology. Pt updated on results.     0919 Pt feeling much better on re-evaluation. Will discharge. Rx for reglan, outpatient follow up.     Pt presenting with ongoing bitemporal HA she's had for the past 2 weeks or so.  States HA is constant and daily.  Reports it's different from previous HAs in that ti's so persistent.  No relief with tylenol/ibuprofen at home.  Pt was [facility] yesterday [facility] for same symptoms, treated with IVF, toradol, reglan and given decadron to prevent rebound.  States she improved until early this morning, again with HA after vomiting.  Having some mid abdominal pain now, worried about her diverticulitis/losis.  Pt afebrile, no meningismus, neuro intact.  Nothing on exam/history to suggest stroke/SAH/meningitis. Will get labs, CT abd/pelvis, hydrate and treat symptomatically.  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    7:00 AM I met with the patient, obtained history, performed an initial exam, and discussed options and plan for diagnostics and treatment here in the ED.  8:24 AM I reevaluated the patient and updated them on results     Medical  Decision Making    History:    Supplemental history from: Documented in chart, if applicable    External Record(s) reviewed: Documented in chart, if applicable. and Outpatient Record: 05/29/23    Work Up:    Chart documentation includes differential considered and any EKGs or imaging independently interpreted by provider, where specified.    In additional to work up documented, I considered the following work up: Documented in chart, if applicable.    External consultation:    Discussion of management with another provider: Documented in chart, if applicable    Complicating factors:    Care impacted by chronic illness: Mental Health and Other: Diverticulitis    Care affected by social determinants of health: N/A    Disposition considerations: Discharge. I prescribed additional prescription strength medication(s) as charted. See documentation for any additional details.      At the conclusion of the encounter I discussed the results of all the tests and the disposition. The questions were answered. The patient or family acknowledged understanding and was agreeable with the care plan.      MEDICATIONS GIVEN IN THE EMERGENCY DEPARTMENT:  Medications   prochlorperazine (COMPAZINE) injection 10 mg (10 mg Intravenous $Given 5/30/23 0729)   0.9% sodium chloride BOLUS (0 mLs Intravenous Stopped 5/30/23 0914)   diphenhydrAMINE (BENADRYL) injection 25 mg (25 mg Intravenous $Given 5/30/23 0729)   ketorolac (TORADOL) injection 15 mg (15 mg Intravenous $Given 5/30/23 0730)   iopamidol (ISOVUE-370) solution 90 mL (90 mLs Intravenous $Given 5/30/23 0814)   ondansetron (ZOFRAN) injection 4 mg (4 mg Intravenous $Given 5/30/23 0822)   morphine (PF) injection 4 mg (4 mg Intravenous $Given 5/30/23 0829)       NEW PRESCRIPTIONS STARTED AT TODAY'S ED VISIT:  Discharge Medication List as of 5/30/2023  9:24 AM      START taking these medications    Details   metoclopramide (REGLAN) 10 MG tablet Take 1 tablet (10 mg) by mouth 4 times daily  "as needed (nausea or headache), Disp-24 tablet, R-0, E-Prescribe             HPI   HPI     Dayana Mcclain is a 37 year old female with a pertinent history of diverticulitis and endometriosis who presents to this ED for evaluation of a headache. The patient states that the headache has been ongoing for the last two weeks and that is has been constant every day since then. The patient says that the headache is present throughout their whole head, but that it is worse on the front side.The patient denies having previous headaches that were this severe. Patient has taken ibuprofen and tylenol to relieve their pain, but that they try to abstain from using the former as it causes flare ups of their diverticulitis. They claim that nothing in particular set off this headache. No head trauma. No new medications.     The patient reports that although they got some relief from their headache [facility] yesterday, they woke up at about 2:00AM-3:00AM this morning because they couldn't sleep and subsequently began vomiting. The patient was given steroids for treatment at the ED. The patient also states that their stomach hurts and feels \"sick\", and that they feel bloated all throughout their abdomen. The patient has not experienced a fever, cough, or sore throat.      The patient reports that they are not on medications for other medical issues and that they live alone. They are not pregnant and have undergone both a hysterectomy and a cholecystectomy, with the latter resulting in having diarrhea on a regular basis.     Per chart review: the patient was seen at [facility] [place] on 05/29/23 for evaluation of a headache. The patient presented to the ED after having a headache for the past week in addition. The patient was given a 10 mg dexamethasone injection, a 15 mg ketorolac injection, a 10 mg metoclopramide injection and an intravenous sodium chloride solution.The patient was discharged.         REVIEW " OF SYSTEMS:  See HPI      Medical History   History reviewed. No pertinent past medical history.    Past Surgical History:   Procedure Laterality Date      SECTION       IR CYSTOGRAM  4/3/2018     KIDNEY STONE SURGERY  8-     LAPAROSCOPIC CYSTECTOMY OVARIAN (ONCOLOGY)  2015       Family History   Problem Relation Age of Onset     No Known Problems Mother      No Known Problems Father        Social History     Tobacco Use     Smoking status: Former     Packs/day: 0.10     Years: 10.00     Pack years: 1.00     Types: Cigarettes     Smokeless tobacco: Never     Tobacco comments:     3 months ago quit   Vaping Use     Vaping status: Never Used   Substance Use Topics     Alcohol use: No     Drug use: No       metoclopramide (REGLAN) 10 MG tablet  EDWARD 0.1 MG/24HR bi-weekly patch  estradiol (CLIMARA) 0.1 MG/24HR weekly patch  estradiol (VIVELLE-DOT) 0.1 MG/24HR bi-weekly patch  fluconazole (DIFLUCAN) 150 MG tablet  gabapentin (NEURONTIN) 100 MG capsule  gabapentin (NEURONTIN) 300 MG capsule  ibuprofen (ADVIL/MOTRIN) 600 MG tablet  methocarbamol (ROBAXIN) 500 MG tablet  nitroFURantoin macrocrystal-monohydrate (MACROBID) 100 MG capsule        Physical Exam     Vitals:  /71   Pulse 79   Temp 97.1  F (36.2  C) (Temporal)   Resp 18   Ht 1.524 m (5')   Wt 79.4 kg (175 lb)   SpO2 96%   BMI 34.18 kg/m      PHYSICAL EXAM:   Physical Exam  Vitals and nursing note reviewed.   Constitutional:       General: She is not in acute distress.     Appearance: Normal appearance.   HENT:      Head: Normocephalic and atraumatic.      Comments: No meningismus.      Nose: Nose normal.      Mouth/Throat:      Mouth: Mucous membranes are moist.      Comments: No erythema or edema in posterior pharynx.   Eyes:      Pupils: Pupils are equal, round, and reactive to light.   Cardiovascular:      Rate and Rhythm: Normal rate and regular rhythm.      Pulses: Normal pulses.           Radial pulses are 2+ on the right side and  2+ on the left side.        Dorsalis pedis pulses are 2+ on the right side and 2+ on the left side.   Pulmonary:      Effort: Pulmonary effort is normal. No respiratory distress.      Breath sounds: Normal breath sounds.   Abdominal:      Palpations: Abdomen is soft.      Comments: Minimal mid abdominal tenderness. No distension or rigidity.    Musculoskeletal:      Cervical back: Full passive range of motion without pain and neck supple.      Comments: No calf tenderness or swelling b/l   Skin:     General: Skin is warm.      Findings: No rash.   Neurological:      General: No focal deficit present.      Mental Status: She is alert. Mental status is at baseline.      Comments: Fluent speech, no acute lateralizing deficits   Psychiatric:         Mood and Affect: Mood normal.         Behavior: Behavior normal.           Results     LAB:  All pertinent labs reviewed and interpreted  Labs Ordered and Resulted from Time of ED Arrival to Time of ED Departure   BASIC METABOLIC PANEL - Abnormal       Result Value    Sodium 140      Potassium 3.9      Chloride 108 (*)     Carbon Dioxide (CO2) 19 (*)     Anion Gap 13      Urea Nitrogen 14.9      Creatinine 0.63      Calcium 9.4      Glucose 146 (*)     GFR Estimate >90     CARBON MONOXIDE - Abnormal    Carbon Monoxide 3.3 (*)    CBC WITH PLATELETS AND DIFFERENTIAL - Abnormal    WBC Count 14.1 (*)     RBC Count 4.99      Hemoglobin 14.6      Hematocrit 43.4      MCV 87      MCH 29.3      MCHC 33.6      RDW 13.5      Platelet Count 414      % Neutrophils 84      % Lymphocytes 14      % Monocytes 1      % Eosinophils 0      % Basophils 0      % Immature Granulocytes 1      NRBCs per 100 WBC 0      Absolute Neutrophils 11.9 (*)     Absolute Lymphocytes 2.0      Absolute Monocytes 0.1      Absolute Eosinophils 0.0      Absolute Basophils 0.0      Absolute Immature Granulocytes 0.1      Absolute NRBCs 0.0     MAGNESIUM - Normal    Magnesium 1.8     HEPATIC FUNCTION PANEL - Normal     Protein Total 6.9      Albumin 3.9      Bilirubin Total 0.3      Alkaline Phosphatase 84      AST 19      ALT 18      Bilirubin Direct <0.20     LIPASE - Normal    Lipase 20         RADIOLOGY:  CT Abdomen Pelvis w Contrast   Final Result   IMPRESSION:    1.  Chronic diverticulosis without evidence for diverticulitis or abscess.   2.  Normal appendix. No bowel obstruction, free intraperitoneal air or abscess.   3.  Small renal cysts that do not need additional follow-up.   4.  Status post cholecystectomy and hysterectomy.   5.  No significant change from 08/10/2022.                   ECG:  none    PROCEDURES:  Procedures:  none      FINAL IMPRESSION:    ICD-10-CM    1. Tension-type headache, not intractable, unspecified chronicity pattern  G44.209       2. Nausea  R11.0           0 minutes of critical care time      I, Ange Marinelli, am serving as a scribe to document services personally performed by Dr. Marv Iqbal, based on my observations and the provider's statements to me. I, Marv Iqbal, DO attest that Ange Marinelli is acting in a scribe capacity, has observed my performance of the services and has documented them in accordance with my direction.      Marv Iqbal DO  Emergency Medicine  [facility] EMERGENCY DEPARTMENT  5/30/2023  7:05 AM        Marv Iqbal MD  05/30/23 0939

## 2023-05-30 NOTE — ED TRIAGE NOTES
Bad headaches for 2 weeks. [facility] and received meds and fluids which improved headache pain. At 3AM headache pain of 10 returned. VSS, A&O, neuros intact. She also complains of ABD pain. HX of diverticulitis. She took Tylenol at 3AM, 2 tabs.     Triage Assessment     Row Name 05/30/23 0649       Triage Assessment (Adult)    Airway WDL WDL       Respiratory WDL    Respiratory WDL WDL       Skin Circulation/Temperature WDL    Skin Circulation/Temperature WDL WDL       Cardiac WDL    Cardiac WDL WDL       Peripheral/Neurovascular WDL    Peripheral Neurovascular WDL WDL       Cognitive/Neuro/Behavioral WDL    Cognitive/Neuro/Behavioral WDL WDL

## 2023-08-10 ENCOUNTER — HOSPITAL ENCOUNTER (OUTPATIENT)
Dept: MAMMOGRAPHY | Facility: CLINIC | Age: 38
Discharge: HOME | End: 2023-08-10
Attending: PHYSICIAN ASSISTANT
Payer: COMMERCIAL

## 2023-08-10 ENCOUNTER — HOSPITAL ENCOUNTER (INPATIENT)
Facility: CLINIC | Age: 38
LOS: 2 days | Discharge: HOME | End: 2023-08-12
Attending: EMERGENCY MEDICINE | Admitting: INTERNAL MEDICINE
Payer: COMMERCIAL

## 2023-08-10 ENCOUNTER — APPOINTMENT (OUTPATIENT)
Dept: MRI IMAGING | Facility: CLINIC | Age: 38
End: 2023-08-10
Attending: EMERGENCY MEDICINE
Payer: COMMERCIAL

## 2023-08-10 ENCOUNTER — APPOINTMENT (OUTPATIENT)
Dept: CT IMAGING | Facility: CLINIC | Age: 38
End: 2023-08-10
Attending: EMERGENCY MEDICINE
Payer: COMMERCIAL

## 2023-08-10 DIAGNOSIS — F41.9 ANXIETY: Primary | ICD-10-CM

## 2023-08-10 DIAGNOSIS — N63.20 LEFT BREAST LUMP: ICD-10-CM

## 2023-08-10 DIAGNOSIS — K85.90 ACUTE PANCREATITIS WITHOUT INFECTION OR NECROSIS, UNSPECIFIED PANCREATITIS TYPE: ICD-10-CM

## 2023-08-10 DIAGNOSIS — R42 DIZZINESS: ICD-10-CM

## 2023-08-10 DIAGNOSIS — H81.10 BENIGN PAROXYSMAL POSITIONAL VERTIGO, UNSPECIFIED LATERALITY: ICD-10-CM

## 2023-08-10 LAB
ALBUMIN SERPL-MCNC: 3.6 G/DL (ref 3.5–5)
ALP SERPL-CCNC: 91 U/L (ref 45–120)
ALT SERPL W P-5'-P-CCNC: 66 U/L (ref 0–45)
ANION GAP SERPL CALCULATED.3IONS-SCNC: 8 MMOL/L (ref 5–18)
AST SERPL W P-5'-P-CCNC: 131 U/L (ref 0–40)
BASOPHILS # BLD MANUAL: 0 10E3/UL (ref 0–0.2)
BASOPHILS NFR BLD MANUAL: 0 %
BILIRUB SERPL-MCNC: 0.3 MG/DL (ref 0–1)
BUN SERPL-MCNC: 10 MG/DL (ref 8–22)
CALCIUM SERPL-MCNC: 8.6 MG/DL (ref 8.5–10.5)
CHLORIDE BLD-SCNC: 108 MMOL/L (ref 98–107)
CO2 SERPL-SCNC: 22 MMOL/L (ref 22–31)
CREAT SERPL-MCNC: 0.71 MG/DL (ref 0.6–1.1)
EOSINOPHIL # BLD MANUAL: 0.2 10E3/UL (ref 0–0.7)
EOSINOPHIL NFR BLD MANUAL: 1 %
ERYTHROCYTE [DISTWIDTH] IN BLOOD BY AUTOMATED COUNT: 13 % (ref 10–15)
GFR SERPL CREATININE-BSD FRML MDRD: >90 ML/MIN/1.73M2
GLUCOSE BLD-MCNC: 102 MG/DL (ref 70–125)
HCT VFR BLD AUTO: 40 % (ref 35–47)
HGB BLD-MCNC: 13.5 G/DL (ref 11.7–15.7)
HOLD SPECIMEN: NORMAL
LIPASE SERPL-CCNC: 1168 U/L (ref 0–52)
LYMPHOCYTES # BLD MANUAL: 5.8 10E3/UL (ref 0.8–5.3)
LYMPHOCYTES NFR BLD MANUAL: 34 %
MCH RBC QN AUTO: 30.1 PG (ref 26.5–33)
MCHC RBC AUTO-ENTMCNC: 33.8 G/DL (ref 31.5–36.5)
MCV RBC AUTO: 89 FL (ref 78–100)
MONOCYTES # BLD MANUAL: 1.5 10E3/UL (ref 0–1.3)
MONOCYTES NFR BLD MANUAL: 9 %
NEUTROPHILS # BLD MANUAL: 9.6 10E3/UL (ref 1.6–8.3)
NEUTROPHILS NFR BLD MANUAL: 56 %
PLAT MORPH BLD: ABNORMAL
PLATELET # BLD AUTO: 389 10E3/UL (ref 150–450)
POTASSIUM BLD-SCNC: 4.1 MMOL/L (ref 3.5–5)
PROT SERPL-MCNC: 6.8 G/DL (ref 6–8)
RBC # BLD AUTO: 4.49 10E6/UL (ref 3.8–5.2)
RBC MORPH BLD: ABNORMAL
SODIUM SERPL-SCNC: 138 MMOL/L (ref 136–145)
VARIANT LYMPHS BLD QL SMEAR: PRESENT
WBC # BLD AUTO: 17.2 10E3/UL (ref 4–11)

## 2023-08-10 PROCEDURE — 250N000013 HC RX MED GY IP 250 OP 250 PS 637: Performed by: INTERNAL MEDICINE

## 2023-08-10 PROCEDURE — 258N000003 HC RX IP 258 OP 636: Performed by: INTERNAL MEDICINE

## 2023-08-10 PROCEDURE — 120N000001 HC R&B MED SURG/OB

## 2023-08-10 PROCEDURE — 96376 TX/PRO/DX INJ SAME DRUG ADON: CPT

## 2023-08-10 PROCEDURE — 85027 COMPLETE CBC AUTOMATED: CPT | Performed by: EMERGENCY MEDICINE

## 2023-08-10 PROCEDURE — 70551 MRI BRAIN STEM W/O DYE: CPT | Mod: 52

## 2023-08-10 PROCEDURE — 250N000011 HC RX IP 250 OP 636: Mod: JZ

## 2023-08-10 PROCEDURE — 80053 COMPREHEN METABOLIC PANEL: CPT | Performed by: EMERGENCY MEDICINE

## 2023-08-10 PROCEDURE — 77062 BREAST TOMOSYNTHESIS BI: CPT

## 2023-08-10 PROCEDURE — 83690 ASSAY OF LIPASE: CPT | Performed by: EMERGENCY MEDICINE

## 2023-08-10 PROCEDURE — 76642 ULTRASOUND BREAST LIMITED: CPT | Mod: LT

## 2023-08-10 PROCEDURE — 96361 HYDRATE IV INFUSION ADD-ON: CPT

## 2023-08-10 PROCEDURE — 99285 EMERGENCY DEPT VISIT HI MDM: CPT | Mod: 25

## 2023-08-10 PROCEDURE — 74176 CT ABD & PELVIS W/O CONTRAST: CPT

## 2023-08-10 PROCEDURE — 99223 1ST HOSP IP/OBS HIGH 75: CPT | Performed by: INTERNAL MEDICINE

## 2023-08-10 PROCEDURE — 36415 COLL VENOUS BLD VENIPUNCTURE: CPT | Performed by: EMERGENCY MEDICINE

## 2023-08-10 PROCEDURE — 250N000011 HC RX IP 250 OP 636: Performed by: EMERGENCY MEDICINE

## 2023-08-10 PROCEDURE — 258N000003 HC RX IP 258 OP 636: Performed by: EMERGENCY MEDICINE

## 2023-08-10 PROCEDURE — 96375 TX/PRO/DX INJ NEW DRUG ADDON: CPT

## 2023-08-10 PROCEDURE — C9113 INJ PANTOPRAZOLE SODIUM, VIA: HCPCS | Mod: JZ | Performed by: EMERGENCY MEDICINE

## 2023-08-10 PROCEDURE — 85007 BL SMEAR W/DIFF WBC COUNT: CPT | Performed by: EMERGENCY MEDICINE

## 2023-08-10 PROCEDURE — 96374 THER/PROPH/DIAG INJ IV PUSH: CPT | Mod: 59

## 2023-08-10 RX ORDER — ESTRADIOL 0.05 MG/D
1 PATCH, EXTENDED RELEASE TRANSDERMAL
Status: DISCONTINUED | OUTPATIENT
Start: 2023-08-11 | End: 2023-08-12 | Stop reason: HOSPADM

## 2023-08-10 RX ORDER — MORPHINE SULFATE 4 MG/ML
4 INJECTION, SOLUTION INTRAMUSCULAR; INTRAVENOUS ONCE
Status: COMPLETED | OUTPATIENT
Start: 2023-08-10 | End: 2023-08-10

## 2023-08-10 RX ORDER — METOCLOPRAMIDE HYDROCHLORIDE 5 MG/ML
10 INJECTION INTRAMUSCULAR; INTRAVENOUS ONCE
Status: COMPLETED | OUTPATIENT
Start: 2023-08-10 | End: 2023-08-10

## 2023-08-10 RX ORDER — ONDANSETRON 2 MG/ML
4 INJECTION INTRAMUSCULAR; INTRAVENOUS ONCE
Status: COMPLETED | OUTPATIENT
Start: 2023-08-10 | End: 2023-08-10

## 2023-08-10 RX ORDER — SODIUM CHLORIDE, SODIUM LACTATE, POTASSIUM CHLORIDE, CALCIUM CHLORIDE 600; 310; 30; 20 MG/100ML; MG/100ML; MG/100ML; MG/100ML
INJECTION, SOLUTION INTRAVENOUS CONTINUOUS
Status: DISCONTINUED | OUTPATIENT
Start: 2023-08-10 | End: 2023-08-12 | Stop reason: HOSPADM

## 2023-08-10 RX ORDER — DIPHENHYDRAMINE HYDROCHLORIDE 50 MG/ML
25 INJECTION INTRAMUSCULAR; INTRAVENOUS ONCE
Status: COMPLETED | OUTPATIENT
Start: 2023-08-10 | End: 2023-08-10

## 2023-08-10 RX ORDER — OXYCODONE HYDROCHLORIDE 5 MG/1
5-10 TABLET ORAL EVERY 4 HOURS PRN
Status: DISCONTINUED | OUTPATIENT
Start: 2023-08-10 | End: 2023-08-12 | Stop reason: HOSPADM

## 2023-08-10 RX ORDER — LIDOCAINE 40 MG/G
CREAM TOPICAL
Status: DISCONTINUED | OUTPATIENT
Start: 2023-08-10 | End: 2023-08-12 | Stop reason: HOSPADM

## 2023-08-10 RX ORDER — GABAPENTIN 100 MG/1
100 CAPSULE ORAL AT BEDTIME
Status: DISCONTINUED | OUTPATIENT
Start: 2023-08-10 | End: 2023-08-12 | Stop reason: HOSPADM

## 2023-08-10 RX ORDER — ESTRADIOL 0.05 MG/D
1 PATCH, EXTENDED RELEASE TRANSDERMAL
Status: DISCONTINUED | OUTPATIENT
Start: 2023-08-10 | End: 2023-08-10

## 2023-08-10 RX ORDER — DIPHENHYDRAMINE HYDROCHLORIDE 50 MG/ML
INJECTION INTRAMUSCULAR; INTRAVENOUS
Status: COMPLETED
Start: 2023-08-10 | End: 2023-08-10

## 2023-08-10 RX ORDER — ESTRADIOL 0.05 MG/D
1 PATCH, EXTENDED RELEASE TRANSDERMAL
COMMUNITY
Start: 2023-08-07

## 2023-08-10 RX ORDER — LORAZEPAM 0.5 MG/1
0.25 TABLET ORAL EVERY 4 HOURS PRN
Status: DISCONTINUED | OUTPATIENT
Start: 2023-08-10 | End: 2023-08-12 | Stop reason: HOSPADM

## 2023-08-10 RX ORDER — ONDANSETRON 2 MG/ML
INJECTION INTRAMUSCULAR; INTRAVENOUS
Status: DISCONTINUED
Start: 2023-08-10 | End: 2023-08-10 | Stop reason: HOSPADM

## 2023-08-10 RX ADMIN — ONDANSETRON 4 MG: 2 INJECTION INTRAMUSCULAR; INTRAVENOUS at 18:20

## 2023-08-10 RX ADMIN — PANTOPRAZOLE SODIUM 40 MG: 40 INJECTION, POWDER, FOR SOLUTION INTRAVENOUS at 18:50

## 2023-08-10 RX ADMIN — OXYCODONE HYDROCHLORIDE 5 MG: 5 TABLET ORAL at 23:02

## 2023-08-10 RX ADMIN — MORPHINE SULFATE 4 MG: 4 INJECTION, SOLUTION INTRAMUSCULAR; INTRAVENOUS at 17:29

## 2023-08-10 RX ADMIN — DIPHENHYDRAMINE HYDROCHLORIDE 25 MG: 50 INJECTION, SOLUTION INTRAMUSCULAR; INTRAVENOUS at 18:20

## 2023-08-10 RX ADMIN — DIPHENHYDRAMINE HYDROCHLORIDE 25 MG: 50 INJECTION INTRAMUSCULAR; INTRAVENOUS at 18:20

## 2023-08-10 RX ADMIN — ONDANSETRON 4 MG: 2 INJECTION INTRAMUSCULAR; INTRAVENOUS at 17:29

## 2023-08-10 RX ADMIN — SODIUM CHLORIDE 1000 ML: 9 INJECTION, SOLUTION INTRAVENOUS at 17:30

## 2023-08-10 RX ADMIN — SODIUM CHLORIDE, POTASSIUM CHLORIDE, SODIUM LACTATE AND CALCIUM CHLORIDE: 600; 310; 30; 20 INJECTION, SOLUTION INTRAVENOUS at 22:46

## 2023-08-10 RX ADMIN — GABAPENTIN 100 MG: 100 CAPSULE ORAL at 22:51

## 2023-08-10 RX ADMIN — METOCLOPRAMIDE HYDROCHLORIDE 10 MG: 5 INJECTION INTRAMUSCULAR; INTRAVENOUS at 17:30

## 2023-08-10 ASSESSMENT — ENCOUNTER SYMPTOMS
DIZZINESS: 1
HEADACHES: 1

## 2023-08-10 ASSESSMENT — ACTIVITIES OF DAILY LIVING (ADL)
ADLS_ACUITY_SCORE: 35

## 2023-08-10 NOTE — ED TRIAGE NOTES
The patient presents to the ED with vertigo that began on Monday. She also reports a migraine. The patient has a history of migraines but reports she usually does not get dizzy with them. The patient reports nystagmus as well. She took ibuprofen for the headache around noon. Denies fevers. Denies nausea/vomiting.

## 2023-08-10 NOTE — LETTER
[facility] [address] [address]  Phone: [phone]  Fax: [phone]    August 12, 2023        Dayana Mcclain  [address] [address]          To whom it may concern:    RE: Dayana Mcclain    Patient hospitalized from 8/10/2023 to 8/12/2023.  Patient cleared to return to work on 8/21/2023.  Any further restrictions per her primary care provider.    Please contact me for questions or concerns.      Sincerely,        Marcus Franco MD  8/12/2023  1:21 PM  [facility] Medicine [facility]  [phone]

## 2023-08-10 NOTE — LETTER
Dayana Mcclain  [address] [address]            August 10, 2023  Date of Exam: 8/10/23    Dear Dayana:    Thank you for your recent visit.    Breast Imaging Result: Based on your recent breast imaging, you have a suspicious area that usually requires a biopsy, at which time a small tissue sample would be taken from your breast.      Breast Density: Your breast imaging shows that you have dense breast tissue. This means you have a slightly higher risk of getting breast cancer. It also means your breast imaging will be harder to read, but it doesn't mean that breast imaging isn't useful. In fact, yearly breast imaging is even more important for individuals at higher risk. Additional testing may be warranted depending on your overall risk.    If you have already made these arrangements, please disregard this letter.    A report of your breast imaging results was sent to: Daniel Ayoub    Your breast imaging will become part of your medical file here at [facility] for at least 10 years. You are responsible for informing any new health care team or breast imaging facility of the date and location of this examination.    We appreciate the opportunity to participate in your health care.    Sincerely,  Xuan Valverde DO   [facility] [place]

## 2023-08-10 NOTE — PROGRESS NOTES
Radiologist, Dr Xuan Valverde, recommends left breast ultrasound guided needle biopsy.     While patient was at [facility], I scheduled pt for breast biopsy appt on Monday 8/14/23, arrival at 12:45pm for 1:00pm appt, at [facility], [address] , [address], [place], [place]. [phone].      I reviewed the procedure and the written pre and post breast biopsy pt handouts with patient and gave patient the written pre and post biopsy patient handouts to take home.     Pt verbalizes understanding of procedure and appt location, date, and time. Calls welcomed.    Tracy Jonhson, RN, BSN, CBCN  [facility]

## 2023-08-10 NOTE — ED PROVIDER NOTES
EMERGENCY DEPARTMENT ENCOUNTER      NAME: Dayana Mcclain  AGE: 37 year old female  YOB: 1985  MRN: [id number]  EVALUATION DATE & TIME: No admission date for patient encounter.    PCP: Debbi Burgos    ED PROVIDER: Marv Esteban M.D.      Chief Complaint   Patient presents with    Dizziness         FINAL IMPRESSION:  1.  Acute dizziness.  2.  Acute abdominal pain.  3.  Acute pancreatitis.    ED COURSE & MEDICAL DECISION MAKIN:34 PM I met with the patient to gather history and to perform my initial exam. We discussed plans for the ED course, including diagnostic testing and treatment. PPE worn: cloth mask.  Patient with chronic history of migraine headache.  Similar headache at this time.  However noting also vertigo since Monday exacerbated with movement which is unusual for her headaches.  She describes head spinning as well as an off-balance sensation.  6:13 PM Patient had a medication reaction to morphine and Reglan.  Not expected, as patient has had these medications previously without difficulty.  Possibly related to reaction on empty stomach.  Patient noted feeling shortness of breath, tachypnea although normal oxygenation and abdominal pain.  I decided to give benadryl and Zofran.  Breathing and anxiety improved after these medications.  Still with diffuse abdominal pain.  Abdominal pain labs and abdominal CT ordered.  Patient notes having had Reglan and morphine in the past without any difficulty.  Protonix was also ordered.  Patient refused any other pain medications at this time.  8:38 PM I updated the patient on laboratory and imaging results.  Findings consistent with pancreatitis with lipase over 1100.  White count also elevated with this.  Patient denies need or desire for pain medications at this time.  Patient has had a previous cholecystectomy.  Only mild elevations of AST and ALT.  Plan admit [facility] to [facility] inpatient with acute pancreatitis.  Patient and family  aware of the findings.  We will page the admitting service.  9:17 PM.  Spoke with the patient and mother and patient will stay in the hospital.  Prescription for nicotine gum.  Patient is going with mother outside to smoke once and then will settle on nicotine gum.  9:21 PM.  Informed by nursing that apparently the patient and her mother disappeared.  It is uncertain why this happened or if they left AMA.  9:35 PM.  Patient and mother returned.  Apparently patient was smoking in her car.  Patient states that she is here to stay at this time.  Bridging orders written.  Hospitalist notified.    Critical care time exclusive of procedures: 30 minutes.    Pertinent Labs & Imaging studies reviewed. (See chart for details)  37 year old female presents to the Emergency Department for evaluation of dizziness and headache.    At the conclusion of the encounter I discussed the results of all of the tests and the disposition. The questions were answered. The patient or family acknowledged understanding and was agreeable with the care plan.              Medical Decision Making    History:  Supplemental history from: Documented in chart, if applicable  External Record(s) reviewed: Both inpatient and outpatient computer records reviewed.    Work Up:  Chart documentation includes differential considered and any EKGs or imaging independently interpreted by provider, where specified.  Differential diagnosis includes vertigo, migraine headache, stroke, etc.  In additional to work up documented, I considered the following work up: Documented in chart, if applicable.    External consultation:  Discussion of management with another provider: Documented in chart, if applicable    Complicating factors:  Care impacted by chronic illness: Migraine headaches.  Care affected by social determinants of health: Access to Medical Care    Disposition considerations: Pending negative evaluation, anticipate discharge home.          MEDICATIONS GIVEN  "IN THE EMERGENCY:  Medications   0.9% sodium chloride BOLUS (0 mLs Intravenous Stopped 8/10/23 194)   ondansetron (ZOFRAN) injection 4 mg (4 mg Intravenous $Given 8/10/23 172)   morphine (PF) injection 4 mg (4 mg Intravenous $Given 8/10/23 172)   metoclopramide (REGLAN) injection 10 mg (10 mg Intravenous $Given 8/10/23 1730)   diphenhydrAMINE (BENADRYL) injection 25 mg (25 mg Intravenous $Given 8/10/23 182)   ondansetron (ZOFRAN) injection 4 mg (4 mg Intravenous $Given 8/10/23 182)   pantoprazole (PROTONIX) IV push injection 40 mg (40 mg Intravenous $Given 8/10/23 185)       NEW PRESCRIPTIONS STARTED AT TODAY'S ER VISIT  New Prescriptions    No medications on file          =================================================================    HPI    Patient information was obtained from: Patient    Use of : N/A       Dayana Mcclain is a 37 year old female with a pertinent history of CAMILO, endometriosis, chronic back pain, diverticulitis, who presents to this ED by private car for evaluation of dizziness.    Patient reports an onset of a recurrent headache but with dizziness, which occured on . She notes her symptoms have been constant and worsens with movement. She feels as if she's \"drunk when walking\". Patient doesn't have a medical history of a stroke, but does have a surgical history of a hysterectomy and cholecystectomy. She is allergie to vancomycin.    She does not identify any waxing or waning symptoms otherwise, exacerbating or alleviating features, associated symptoms except as mentioned. She denies any pain related complaints.    REVIEW OF SYSTEMS   Review of Systems   Neurological:  Positive for dizziness and headaches (recurrent).   All other systems reviewed and are negative.       PAST MEDICAL HISTORY:  History reviewed. No pertinent past medical history.    PAST SURGICAL HISTORY:  Past Surgical History:   Procedure Laterality Date     SECTION      IR CYSTOGRAM  4/3/2018    " KIDNEY STONE SURGERY      LAPAROSCOPIC CYSTECTOMY OVARIAN (ONCOLOGY)  2015           CURRENT MEDICATIONS:    EDWARD 0.1 MG/24HR bi-weekly patch  estradiol (CLIMARA) 0.1 MG/24HR weekly patch  estradiol (VIVELLE-DOT) 0.1 MG/24HR bi-weekly patch  fluconazole (DIFLUCAN) 150 MG tablet  gabapentin (NEURONTIN) 100 MG capsule  gabapentin (NEURONTIN) 300 MG capsule  ibuprofen (ADVIL/MOTRIN) 600 MG tablet  methocarbamol (ROBAXIN) 500 MG tablet  metoclopramide (REGLAN) 10 MG tablet  nitroFURantoin macrocrystal-monohydrate (MACROBID) 100 MG capsule        ALLERGIES:  Allergies   Allergen Reactions    Vancomycin Itching     Complained of itching hands/arms after infusion completed.    Hydrocodone Hives and Nausea    Hydromorphone Itching    Amoxicillin Rash    Erythromycin Nausea and Vomiting and Rash    Vicodin [Hydrocodone-Acetaminophen] Rash       FAMILY HISTORY:  Family History   Problem Relation Age of Onset    No Known Problems Mother     No Known Problems Father        SOCIAL HISTORY:   Social History     Socioeconomic History    Marital status: Single     Spouse name: None    Number of children: None    Years of education: None    Highest education level: None   Tobacco Use    Smoking status: Former     Packs/day: 0.10     Years: 10.00     Pack years: 1.00     Types: Cigarettes    Smokeless tobacco: Never    Tobacco comments:     3 months ago quit   Vaping Use    Vaping Use: Never used   Substance and Sexual Activity    Alcohol use: No    Drug use: No    Sexual activity: Yes     Partners: Male   Former smoker.  No current drugs, alcohol, or tobacco.    VITALS:  /81   Pulse 68   Temp 97.2  F (36.2  C) (Temporal)   Resp 16   Ht 1.524 m (5')   Wt 81.6 kg (180 lb)   SpO2 97%   BMI 35.15 kg/m      PHYSICAL EXAM    Vital Signs:  /81   Pulse 68   Temp 97.2  F (36.2  C) (Temporal)   Resp 16   Ht 1.524 m (5')   Wt 81.6 kg (180 lb)   SpO2 97%   BMI 35.15 kg/m    General:  On entering the room she is  in no apparent distress.    Neck:  Neck supple with full range of motion and nontender.    Back:  Back and spine are nontender.  No costovertebral angle tenderness.    HEENT:  Oropharynx clear with moist mucous membranes.  HEENT unremarkable.    Pulmonary:  Chest clear to auscultation without rhonchi rales or wheezing.    Cardiovascular:  Cardiac regular rate and rhythm without murmurs rubs or gallops.    Abdomen:  Abdomen soft nontender.  There is no rebound or guarding.    Muskuloskeletal:  She moves all 4 without any difficulty and has normal neurovascular exams.  Extremities without clubbing, cyanosis, or edema.  Legs and calves are nontender.    Neuro:  She is alert and oriented ×3 and moves all extremities symmetrically.  Strength 5/5 in all 4 extremities.  Sensation intact in all 4 extremities.  Pupils reactive to light.  Extraocular movements intact.  Cerebellar function by finger-nose testing good and equal bilaterally.  No nystagmus.  Psych:  Normal affect.    Skin:  Unremarkable and warm and dry.       LAB:  All pertinent labs reviewed and interpreted.  Labs Ordered and Resulted from Time of ED Arrival to Time of ED Departure   COMPREHENSIVE METABOLIC PANEL - Abnormal       Result Value    Sodium 138      Potassium 4.1      Chloride 108 (*)     Carbon Dioxide (CO2) 22      Anion Gap 8      Urea Nitrogen 10      Creatinine 0.71      Calcium 8.6      Glucose 102      Alkaline Phosphatase 91       (*)     ALT 66 (*)     Protein Total 6.8      Albumin 3.6      Bilirubin Total 0.3      GFR Estimate >90     LIPASE - Abnormal    Lipase 1,168 (*)    CBC WITH PLATELETS AND DIFFERENTIAL - Abnormal    WBC Count 17.2 (*)     RBC Count 4.49      Hemoglobin 13.5      Hematocrit 40.0      MCV 89      MCH 30.1      MCHC 33.8      RDW 13.0      Platelet Count 389     DIFFERENTIAL - Abnormal    % Neutrophils 56      % Lymphocytes 34      % Monocytes 9      % Eosinophils 1      % Basophils 0      Absolute Neutrophils  9.6 (*)     Absolute Lymphocytes 5.8 (*)     Absolute Monocytes 1.5 (*)     Absolute Eosinophils 0.2      Absolute Basophils 0.0      RBC Morphology Confirmed RBC Indices      Platelet Assessment        Value: Automated Count Confirmed. Platelet morphology is normal.    Reactive Lymphocytes Present (*)        RADIOLOGY:  Reviewed all pertinent imaging. Please see official radiology report.  Abd/pelvis CT no contrast - Stone Protocol   Final Result   IMPRESSION:    1.  No acute findings or inflammatory changes in the abdomen or pelvis.      2.  Colonic diverticulosis. No evidence of acute diverticulitis.         MR Brain w/o Contrast   Final Result   IMPRESSION:   1.  Patient terminated the exam. Only a portion of the sequences were acquired.   2.  There is no restricted diffusion to indicate acute infarct.   3.  Normal ventricles. No mass effect.   4.  The imaged brain is unremarkable.                 EKG:          PROCEDURES:         I, Darya Mccullough, am serving as a scribe to document services personally performed by Dr. Esteban based on my observation and the provider's statements to me. I, Marv Esteban MD attest that Darya Mccullough is acting in a scribe capacity, has observed my performance of the services and has documented them in accordance with my direction.    Marv Esteban M.D.  Emergency Medicine  [facility] EMERGENCY ROOM  [address] [address] [phone]  Dept: [phone]       Marv Esteban MD  08/10/23 2056       Marv Esteban MD  08/10/23 2117       Marv Esteban MD  08/10/23 2122       Marv Esteban MD  08/10/23 2135

## 2023-08-10 NOTE — ED NOTES
Pt screaming and brought back from MRI after a reaction to the reglan. MD was made a aware and came to pt bedside. Ordered 25 mg of benadryl and 4 mg of zofran. Pt currently having severe abdominal pain and diaphoretic. Pt wanting to go home at this point.

## 2023-08-11 ENCOUNTER — APPOINTMENT (OUTPATIENT)
Dept: ULTRASOUND IMAGING | Facility: CLINIC | Age: 38
End: 2023-08-11
Attending: FAMILY MEDICINE
Payer: COMMERCIAL

## 2023-08-11 ENCOUNTER — APPOINTMENT (OUTPATIENT)
Dept: MRI IMAGING | Facility: CLINIC | Age: 38
End: 2023-08-11
Attending: INTERNAL MEDICINE
Payer: COMMERCIAL

## 2023-08-11 PROBLEM — F41.9 ANXIETY: Status: ACTIVE | Noted: 2023-08-11

## 2023-08-11 PROBLEM — D18.03 HEPATIC HEMANGIOMA: Status: ACTIVE | Noted: 2021-12-06

## 2023-08-11 PROBLEM — N80.9 ENDOMETRIOSIS: Status: ACTIVE | Noted: 2023-02-17

## 2023-08-11 PROBLEM — H81.10 BPPV (BENIGN PAROXYSMAL POSITIONAL VERTIGO): Status: ACTIVE | Noted: 2023-08-11

## 2023-08-11 LAB
ALBUMIN SERPL-MCNC: 3.1 G/DL (ref 3.5–5)
ALP SERPL-CCNC: 90 U/L (ref 45–120)
ALT SERPL W P-5'-P-CCNC: 137 U/L (ref 0–45)
ANION GAP SERPL CALCULATED.3IONS-SCNC: 4 MMOL/L (ref 5–18)
AST SERPL W P-5'-P-CCNC: 129 U/L (ref 0–40)
BASOPHILS # BLD AUTO: 0 10E3/UL (ref 0–0.2)
BASOPHILS NFR BLD AUTO: 0 %
BILIRUB SERPL-MCNC: 0.5 MG/DL (ref 0–1)
BUN SERPL-MCNC: 7 MG/DL (ref 8–22)
CALCIUM SERPL-MCNC: 8.3 MG/DL (ref 8.5–10.5)
CHLORIDE BLD-SCNC: 109 MMOL/L (ref 98–107)
CO2 SERPL-SCNC: 24 MMOL/L (ref 22–31)
CREAT SERPL-MCNC: 0.6 MG/DL (ref 0.6–1.1)
EOSINOPHIL # BLD AUTO: 0.1 10E3/UL (ref 0–0.7)
EOSINOPHIL NFR BLD AUTO: 1 %
ERYTHROCYTE [DISTWIDTH] IN BLOOD BY AUTOMATED COUNT: 13.1 % (ref 10–15)
GFR SERPL CREATININE-BSD FRML MDRD: >90 ML/MIN/1.73M2
GLUCOSE BLD-MCNC: 95 MG/DL (ref 70–125)
HCT VFR BLD AUTO: 37.3 % (ref 35–47)
HGB BLD-MCNC: 12.4 G/DL (ref 11.7–15.7)
IMM GRANULOCYTES # BLD: 0 10E3/UL
IMM GRANULOCYTES NFR BLD: 0 %
LIPASE SERPL-CCNC: 85 U/L (ref 0–52)
LYMPHOCYTES # BLD AUTO: 3.2 10E3/UL (ref 0.8–5.3)
LYMPHOCYTES NFR BLD AUTO: 27 %
MAGNESIUM SERPL-MCNC: 1.9 MG/DL (ref 1.8–2.6)
MCH RBC QN AUTO: 29.5 PG (ref 26.5–33)
MCHC RBC AUTO-ENTMCNC: 33.2 G/DL (ref 31.5–36.5)
MCV RBC AUTO: 89 FL (ref 78–100)
MONOCYTES # BLD AUTO: 1.1 10E3/UL (ref 0–1.3)
MONOCYTES NFR BLD AUTO: 10 %
NEUTROPHILS # BLD AUTO: 7.3 10E3/UL (ref 1.6–8.3)
NEUTROPHILS NFR BLD AUTO: 62 %
NRBC # BLD AUTO: 0 10E3/UL
NRBC BLD AUTO-RTO: 0 /100
PLATELET # BLD AUTO: 324 10E3/UL (ref 150–450)
POTASSIUM BLD-SCNC: 3.9 MMOL/L (ref 3.5–5)
PROT SERPL-MCNC: 5.7 G/DL (ref 6–8)
RBC # BLD AUTO: 4.2 10E6/UL (ref 3.8–5.2)
SODIUM SERPL-SCNC: 137 MMOL/L (ref 136–145)
TRIGL SERPL-MCNC: 97 MG/DL
WBC # BLD AUTO: 11.8 10E3/UL (ref 4–11)

## 2023-08-11 PROCEDURE — 120N000001 HC R&B MED SURG/OB

## 2023-08-11 PROCEDURE — 70553 MRI BRAIN STEM W/O & W/DYE: CPT

## 2023-08-11 PROCEDURE — 70549 MR ANGIOGRAPH NECK W/O&W/DYE: CPT

## 2023-08-11 PROCEDURE — 258N000003 HC RX IP 258 OP 636: Performed by: INTERNAL MEDICINE

## 2023-08-11 PROCEDURE — 80053 COMPREHEN METABOLIC PANEL: CPT | Performed by: INTERNAL MEDICINE

## 2023-08-11 PROCEDURE — A9585 GADOBUTROL INJECTION: HCPCS | Mod: JZ | Performed by: FAMILY MEDICINE

## 2023-08-11 PROCEDURE — 70544 MR ANGIOGRAPHY HEAD W/O DYE: CPT

## 2023-08-11 PROCEDURE — 76705 ECHO EXAM OF ABDOMEN: CPT

## 2023-08-11 PROCEDURE — 250N000013 HC RX MED GY IP 250 OP 250 PS 637: Performed by: FAMILY MEDICINE

## 2023-08-11 PROCEDURE — 250N000013 HC RX MED GY IP 250 OP 250 PS 637: Performed by: INTERNAL MEDICINE

## 2023-08-11 PROCEDURE — 85025 COMPLETE CBC W/AUTO DIFF WBC: CPT | Performed by: INTERNAL MEDICINE

## 2023-08-11 PROCEDURE — 83690 ASSAY OF LIPASE: CPT | Performed by: INTERNAL MEDICINE

## 2023-08-11 PROCEDURE — 84478 ASSAY OF TRIGLYCERIDES: CPT | Performed by: INTERNAL MEDICINE

## 2023-08-11 PROCEDURE — 83735 ASSAY OF MAGNESIUM: CPT | Performed by: INTERNAL MEDICINE

## 2023-08-11 PROCEDURE — 255N000002 HC RX 255 OP 636: Mod: JZ | Performed by: FAMILY MEDICINE

## 2023-08-11 PROCEDURE — 36415 COLL VENOUS BLD VENIPUNCTURE: CPT | Performed by: INTERNAL MEDICINE

## 2023-08-11 PROCEDURE — 99232 SBSQ HOSP IP/OBS MODERATE 35: CPT | Performed by: FAMILY MEDICINE

## 2023-08-11 RX ORDER — MECLIZINE HCL 12.5 MG 12.5 MG/1
12.5 TABLET ORAL EVERY 6 HOURS SCHEDULED
Status: DISCONTINUED | OUTPATIENT
Start: 2023-08-11 | End: 2023-08-11

## 2023-08-11 RX ORDER — ACETAMINOPHEN 325 MG/1
650 TABLET ORAL EVERY 4 HOURS PRN
Status: DISCONTINUED | OUTPATIENT
Start: 2023-08-11 | End: 2023-08-12 | Stop reason: HOSPADM

## 2023-08-11 RX ORDER — MECLIZINE HYDROCHLORIDE 25 MG/1
25 TABLET ORAL EVERY 6 HOURS SCHEDULED
Status: DISCONTINUED | OUTPATIENT
Start: 2023-08-11 | End: 2023-08-12 | Stop reason: HOSPADM

## 2023-08-11 RX ORDER — ACETAMINOPHEN 650 MG/1
650 SUPPOSITORY RECTAL EVERY 4 HOURS PRN
Status: DISCONTINUED | OUTPATIENT
Start: 2023-08-11 | End: 2023-08-12 | Stop reason: HOSPADM

## 2023-08-11 RX ORDER — MECLIZINE HYDROCHLORIDE 25 MG/1
25 TABLET ORAL 3 TIMES DAILY PRN
Status: DISCONTINUED | OUTPATIENT
Start: 2023-08-11 | End: 2023-08-12

## 2023-08-11 RX ORDER — MAGNESIUM OXIDE 400 MG/1
400 TABLET ORAL EVERY 4 HOURS
Status: COMPLETED | OUTPATIENT
Start: 2023-08-11 | End: 2023-08-11

## 2023-08-11 RX ORDER — GADOBUTROL 604.72 MG/ML
10 INJECTION INTRAVENOUS ONCE
Status: COMPLETED | OUTPATIENT
Start: 2023-08-11 | End: 2023-08-11

## 2023-08-11 RX ADMIN — GABAPENTIN 100 MG: 100 CAPSULE ORAL at 22:28

## 2023-08-11 RX ADMIN — SODIUM CHLORIDE, POTASSIUM CHLORIDE, SODIUM LACTATE AND CALCIUM CHLORIDE: 600; 310; 30; 20 INJECTION, SOLUTION INTRAVENOUS at 19:45

## 2023-08-11 RX ADMIN — SODIUM CHLORIDE, POTASSIUM CHLORIDE, SODIUM LACTATE AND CALCIUM CHLORIDE: 600; 310; 30; 20 INJECTION, SOLUTION INTRAVENOUS at 05:11

## 2023-08-11 RX ADMIN — OXYCODONE HYDROCHLORIDE 5 MG: 5 TABLET ORAL at 07:01

## 2023-08-11 RX ADMIN — ESTRADIOL 1 PATCH: 0.05 PATCH, EXTENDED RELEASE TRANSDERMAL at 12:10

## 2023-08-11 RX ADMIN — Medication 400 MG: at 10:48

## 2023-08-11 RX ADMIN — MECLIZINE HYDROCHLORIDE 25 MG: 25 TABLET ORAL at 17:15

## 2023-08-11 RX ADMIN — OXYCODONE HYDROCHLORIDE 5 MG: 5 TABLET ORAL at 12:08

## 2023-08-11 RX ADMIN — SODIUM CHLORIDE, POTASSIUM CHLORIDE, SODIUM LACTATE AND CALCIUM CHLORIDE: 600; 310; 30; 20 INJECTION, SOLUTION INTRAVENOUS at 13:20

## 2023-08-11 RX ADMIN — LORAZEPAM 0.25 MG: 0.5 TABLET ORAL at 22:28

## 2023-08-11 RX ADMIN — Medication 400 MG: at 07:01

## 2023-08-11 RX ADMIN — GADOBUTROL 10 ML: 604.72 INJECTION INTRAVENOUS at 11:51

## 2023-08-11 RX ADMIN — LORAZEPAM 0.25 MG: 0.5 TABLET ORAL at 02:21

## 2023-08-11 ASSESSMENT — ACTIVITIES OF DAILY LIVING (ADL)
WEAR_GLASSES_OR_BLIND: YES
CONCENTRATING,_REMEMBERING_OR_MAKING_DECISIONS_DIFFICULTY: NO
ADLS_ACUITY_SCORE: 22
ADLS_ACUITY_SCORE: 37
ADLS_ACUITY_SCORE: 22
ADLS_ACUITY_SCORE: 37
CHANGE_IN_FUNCTIONAL_STATUS_SINCE_ONSET_OF_CURRENT_ILLNESS/INJURY: NO
TOILETING_ISSUES: NO
DIFFICULTY_EATING/SWALLOWING: NO
VISION_MANAGEMENT: GLASSES
FALL_HISTORY_WITHIN_LAST_SIX_MONTHS: NO
ADLS_ACUITY_SCORE: 22
ADLS_ACUITY_SCORE: 22
DOING_ERRANDS_INDEPENDENTLY_DIFFICULTY: NO
ADLS_ACUITY_SCORE: 37
DRESSING/BATHING_DIFFICULTY: NO
ADLS_ACUITY_SCORE: 22
WALKING_OR_CLIMBING_STAIRS_DIFFICULTY: NO

## 2023-08-11 NOTE — PLAN OF CARE
Problem: Pain Acute  Goal: Optimal Pain Control and Function  Outcome: Progressing  Intervention: Develop Pain Management Plan  Recent Flowsheet Documentation  Taken 8/10/2023 2253 by Norah Sears RN  Pain Management Interventions: medication (see MAR)  Intervention: Prevent or Manage Pain  Recent Flowsheet Documentation  Taken 8/11/2023 0220 by Norah Sears, RN  Medication Review/Management: medications reviewed  Taken 8/10/2023 2256 by Norah Sears RN  Medication Review/Management: medications reviewed     Problem: Electrolyte Imbalance  Goal: Electrolyte Imbalance: Plan of Care  Outcome: Progressing     Problem: Muscle Strength Impairment  Goal: Improved Muscle Strength  Outcome: Progressing     Goal Outcome Evaluation:  Pt is a/ox4. Pt rated upper left quadrant pain 6/10, managed with MAR and rest. Pt denied any chest pain and SOB. Pt reported increase dizziness with exertion/movement.  Infusing /hr   Pt is standby assist with gait belt.  Maintain NPO after midnight,  Continue POC

## 2023-08-11 NOTE — CONSULTS
"GI CONSULT NOTE      Name: Dayana Mcclain    Medical Record #: [id number]    YOB: 1985    Date: 8/11/2023    CC: We were consulted by Gaby Corey MD to see Dayana Mcclain in regards to pancreatitis.    HPI: This is a 37 year old female with a history of migraines, tobacco use, cholecystectomy for cholelithiasis who presented with dizziness, abdominal pain and found to have lipase 1100's. CT shows normal pancreas.  , ALT 66 yesterday, versus ,  today. TG, Calcium normal. WBC 17K on admit, now 11.8K.  Ultrasound shows normal common bile duct of 6 mm.  Patient was at the [facility] yesterday for breat US . She has a history of dizziness, was feeling dizzy, and her mother recommended that they stop in at the [facility] ER.  She had incomplete MRI of her head done yesterday, though elected not to move forward with further imaging.  Had elevated lipase and on further discussion reported epigastric pain which was quite significant.  No prior history of pancreatitis.  Consumed generous alcohol this past weekend but none since then.  No ongoing alcohol abuse.  Reports father had alcoholic pancreatitis.  Patient is history of chronic intermittent abdominal pain of unclear etiology.  Reports being told in the past it was due to \"diverticulosis\".    Had EGD done through [facility] October 2020 for epigastric pain, bloating, diarrhea.  There were no endoscopic abnormalities.  Gastric biopsies negative for H. pylori, duodenal biopsies negative for celiac disease.  Colonoscopy the same day showed normal ileum, diverticulosis from the descending colon to the rectum, external hemorrhoids.  Lymphoid aggregate biopsied from her colon.    Past medical history  migraines, tobacco use, cholecystectomy, asthma, anxiety, chronic LBP.     Family history  Father with alcoholic pancreatits.    Social history  Social History     Tobacco Use    Smoking status: Former     Packs/day: 0.10     " Years: 10.00     Pack years: 1.00     Types: Cigarettes    Smokeless tobacco: Never    Tobacco comments:     3 months ago quit   Vaping Use    Vaping Use: Never used   Substance Use Topics    Alcohol use: No    Drug use: No       Medications:   Current Outpatient Medications   Medication Sig Dispense Refill    estradiol (VIVELLE-DOT) 0.05 MG/24HR bi-weekly patch Apply 1 patch topically twice a week Monday and Thursday      gabapentin (NEURONTIN) 100 MG capsule Take 100 mg by mouth At Bedtime            Allergies:    Allergies   Allergen Reactions    Vancomycin Itching     Complained of itching hands/arms after infusion completed.    Hydrocodone Hives and Nausea    Hydromorphone Itching    Amoxicillin Rash    Erythromycin Nausea and Vomiting and Rash    Vicodin [Hydrocodone-Acetaminophen] Rash          REVIEW OF SYSTEMS (ROS): Review of systems is as per HPI.  Remainder of complete review of systems is negative.      PHYSICAL EXAMINATION:      /64 (BP Location: Right arm, Patient Position: Semi-Sumner's, Cuff Size: Adult Regular)   Pulse 70   Temp 98  F (36.7  C) (Oral)   Resp 20   Ht 1.524 m (5')   Wt 81.6 kg (180 lb)   SpO2 96%   BMI 35.15 kg/m    GEN: Well developed, well nourished 37 year old female in no acute distress.  HEENT: sclera anicteric, moist mucous membranes, neck soft and supple.  LYMPH: No cervical lymphadenopathy  PULM: lungs clear to auscultation bilaterally.  CARDIO: Regular rate and rhythm  GI: Non-distended.  Bowel sounds positive.  Soft.  Mildly-tender to palpation in epigastrium.  No guarding.  EXT: warm, no lower extremity edema  NEURO: Alert and oriented.  Speech fluid.    PSYCH: Mental status appropriate, mood and affect normal.      LABS and IMAGING  Recent Labs   Lab 08/11/23  0546 08/10/23  1850   WBC 11.8* 17.2*   RBC 4.20 4.49   HGB 12.4 13.5   HCT 37.3 40.0   MCV 89 89   MCH 29.5 30.1   MCHC 33.2 33.8   RDW 13.1 13.0    389      Recent Labs   Lab 08/11/23  0546  08/10/23  1945    138   CO2 24 22   BUN 7* 10     Recent Labs   Lab 08/11/23  0546 08/10/23  1945   ALKPHOS 90 91   * 131*   * 66*     No results found for: INR, TROPONIN, TROPI, TROPONIN, TROPR, TROPN    Abd/pelvis CT no contrast - Stone Protocol    Result Date: 8/10/2023  EXAM: CT ABDOMEN PELVIS W/O CONTRAST LOCATION: [facility] DATE: 8/10/2023 INDICATION: Abdominal pain. COMPARISON: 05/30/2023, 08/09/2022 TECHNIQUE: CT scan of the abdomen and pelvis was performed without IV contrast. Multiplanar reformats were obtained. Dose reduction techniques were used. CONTRAST: None. FINDINGS: LOWER CHEST: Unremarkable. HEPATOBILIARY: Right hepatic lobe 1.7 x 1.2 cm low-attenuation lesion (series 3, image 23), corresponding with a subtly enhancing lesion on prior CTs, not significantly changed since 08/09/2022, most likely benign hemangioma. Status post cholecystectomy. PANCREAS: Normal. SPLEEN: Normal. ADRENAL GLANDS: Normal. KIDNEYS/BLADDER: Normal. BOWEL: Mild colonic diverticulosis. No obstruction or inflammatory change. Normal appendix. No evidence of acute appendicitis. LYMPH NODES: No lymphadenopathy. VASCULATURE: Unremarkable. PELVIC ORGANS: Status post hysterectomy. MUSCULOSKELETAL: Unremarkable.     IMPRESSION: 1.  No acute findings or inflammatory changes in the abdomen or pelvis. 2.  Colonic diverticulosis. No evidence of acute diverticulitis.     MR Brain w/o Contrast    Result Date: 8/10/2023  EXAM: MR BRAIN W/O CONTRAST LOCATION: [facility] DATE: 8/10/2023 INDICATION: Migraine headache with dizziness, difficulty walking and altered gait. COMPARISON: None. TECHNIQUE: Brain MRI without intravenous contrast. Diffusion, sagittal T1 and partial acquisition of the axial T2 FLAIR was performed. Patient aborted the exam. FINDINGS: There is no restricted diffusion. Paranasal sinuses are free from significant disease. Mastoid air cells appear free  from significant disease. Intraorbital contents are unremarkable. Ventricles are within normal limits in size for the patient's age. There is no mass effect, midline shift, or extraaxial collection. Signal intensity of the imaged brain parenchyma is within normal limits for the patient's age.     IMPRESSION: 1.  Patient terminated the exam. Only a portion of the sequences were acquired. 2.  There is no restricted diffusion to indicate acute infarct. 3.  Normal ventricles. No mass effect. 4.  The imaged brain is unremarkable.    MA Diagnostic Bilateral w/Pao    Result Date: 8/10/2023  EXAM: MA DIAGNOSTIC BILATERAL W/ PAO, US BREAST LEFT LIMITED 1-3 QUADRANTS LOCATION: [facility] DATE: 8/10/2023 INDICATION: 37-year-old female presents with a palpable lump within the left breast for approximately one week. Patient also reports chronic, nonspontaneous, clear and milky left nipple discharge for approximately one year. Patient denies any episodes of  bloody nipple discharge. COMPARISON: None. Baseline examination. MAMMOGRAPHIC FINDINGS: Bilateral full-field digital diagnostic mammograms performed. The breasts are heterogeneously dense, which may obscure small masses. Images evaluated with the assistance of CAD. Breast tomosynthesis was used in interpretation. A BB marker was placed on the skin overlying the area of palpable concern within the left breast. There is an underlying oval mass with circumscribed margins. Recommend targeted left breast ultrasound at the area of palpable concern for further evaluation. The remaining left breast parenchyma is unremarkable. There are no suspicious masses, calcifications, or architectural distortion within the right breast. ULTRASOUND FINDINGS: Patient directed physical examination of the palpable area of concern demonstrates an approximately 2.0 cm oval, soft, mobile mass located immediately superior to the left nipple. Targeted left breast ultrasound at  the area of palpable concern at the 1:00 position, 1 cm from the nipple demonstrates a 1.7 x 1.3 x 1.8 cm oval, hypoechoic mass with circumscribed margins and associated posterior acoustic enhancement. There is no significant internal vascularity. This finding may also be located within a duct.     IMPRESSION: 1.  Oval, hypoechoic mass within the left breast at the 1:00 position correlates with the palpable area of concern. Differential diagnosis includes fibroadenoma versus papilloma, as this mass may in fact be located within a duct. Recommend ultrasound-guided biopsy for definitive pathologic diagnosis. ACR BI-RADS Category 4: Suspicious. I personally scanned and discussed the findings and recommendations with the patient , and the patient's mother, at the conclusion of the examination.     US Breast Left    Result Date: 8/10/2023  EXAM: MA DIAGNOSTIC BILATERAL W/ PAO, US BREAST LEFT LIMITED 1-3 QUADRANTS LOCATION: [facility] DATE: 8/10/2023 INDICATION: 37-year-old female presents with a palpable lump within the left breast for approximately one week. Patient also reports chronic, nonspontaneous, clear and milky left nipple discharge for approximately one year. Patient denies any episodes of  bloody nipple discharge. COMPARISON: None. Baseline examination. MAMMOGRAPHIC FINDINGS: Bilateral full-field digital diagnostic mammograms performed. The breasts are heterogeneously dense, which may obscure small masses. Images evaluated with the assistance of CAD. Breast tomosynthesis was used in interpretation. A BB marker was placed on the skin overlying the area of palpable concern within the left breast. There is an underlying oval mass with circumscribed margins. Recommend targeted left breast ultrasound at the area of palpable concern for further evaluation. The remaining left breast parenchyma is unremarkable. There are no suspicious masses, calcifications, or architectural distortion  within the right breast. ULTRASOUND FINDINGS: Patient directed physical examination of the palpable area of concern demonstrates an approximately 2.0 cm oval, soft, mobile mass located immediately superior to the left nipple. Targeted left breast ultrasound at the area of palpable concern at the 1:00 position, 1 cm from the nipple demonstrates a 1.7 x 1.3 x 1.8 cm oval, hypoechoic mass with circumscribed margins and associated posterior acoustic enhancement. There is no significant internal vascularity. This finding may also be located within a duct.     IMPRESSION: 1.  Oval, hypoechoic mass within the left breast at the 1:00 position correlates with the palpable area of concern. Differential diagnosis includes fibroadenoma versus papilloma, as this mass may in fact be located within a duct. Recommend ultrasound-guided biopsy for definitive pathologic diagnosis. ACR BI-RADS Category 4: Suspicious. I personally scanned and discussed the findings and recommendations with the patient , and the patient's mother, at the conclusion of the examination.        ASSESSMENT  This is a 37 year old female with a history of migraines, tobacco use, cholecystectomy for cholelithiasis who presented with dizziness, abdominal pain and found to have lipase 1100's.    1. Epigastric pain  2. Elevated lipase  3. Abnormal transaminases  Patient technically meets criteria for pancreatitis based on significantly elevated lipase and epigastric pain.  Also had leukocytosis and mild LFT elevations.  It is possible she may have passed some sludge or even a small stone.  Sometimes this can occur and cause pancreatitis without seeing parenchymal changes to the pancreas if the biliary obstruction quickly resolves.    EtOH pancreatitis possible, given EtOH use this past weekend though she reports she did not have any abdominal pain earlier this week.       Patient Active Problem List   Diagnosis    Acute diverticulitis    Lichen sclerosus et  atrophicus of the vulva    Herpes simplex vulvovaginitis    CAMILO (generalized anxiety disorder)    Synovitis of left ankle    Tibialis posterior tendinitis, left    Acquired pes planovalgus, left    Dizziness    Acute pancreatitis without infection or necrosis, unspecified pancreatitis type     PLAN  1. Continue IV fluids at 150cc/hr.  2. Trend LFT's  3.  PPI daily.  4.  Clear liquids  5.  Encourage ambulation.    A total of 50 minutes was spent on today's care.  This included chart review, evaluation examination of the patient, discussion with RN, formulation of assessment and plan, discussion with Dr. Ordonez, .    Thank you for asking to consult on this patient.    Bradley Weldon PA-C   8/11/2023 8:30 AM  [facility]  [phone]       [place]:    Patient seen and examined personally by me today.  Agree with assessment plan per KAYLEE Huerta.    In summary, 37-year-old patient, denies of excessive alcohol use, s/p cholecystectomy admitted for dizziness, abdominal pain and found to have a lipase up to 1100.  Mild elevation of AST ALT.  Ultrasound showed normal common bile duct of 6 mm.  CT was unremarkable except for stable small liver lesion which is no change since 2022.  No evidence of pancreatitis..  Given patient's pain and biochemicaly lipase, it is consistent with pancreatitis.  Patient may passed biliary sludge/microlithiasis quickly which did not cause pancreatic parenchymal change.  Recommend conservative management with pancreatitis: Pain control, IV fluid, n.p.o.  Trending liver function test on daily basis.  GI will continue follow-up.    Total of 15 minutes spent to review patient's chart, exam and discussed with patient./family,  discussed with PA,.     GI attending  Dr Matilda Ordonez

## 2023-08-11 NOTE — H&P
"[facility] MEDICINE ADMISSION HISTORY AND PHYSICAL       Assessment & Plan      1. Elevated lipase - no ETOH use, no GB. Not on any new prescription. Father had pancreatic issues, denies cancer history    Unclear etiology of pancreatitis     - IVF, NPO, anti emetics  - CBC/CMP/TG and lipase  - pain meds  - GI eval     2. Intermittent Dizziness -- for 3 days now. Reports of walking like drunk, and has headaches. She also reported blurriness of vision     History of migraine headaches, sounds episodic.   Etiology unclear for dizziness - With her reports of dizziness with movement, seems BPPV. I did check her ears and its clear. No URI symptoms    Could be central or peripheral. No neck manipulation. No neck pain. MRI head -- incomplete study    - Encourage to repeat MRI with MRA - She is not willing to do another MRI/MRA at this point, despite discussion of what the test for including r/o vertebral dissection or cerebellar strokes, or issues on posterior circulation     3. Had breast US today for breast lump  - outpatient follow up     4. Other chronic medical issues  - Asthma  - Anxiety - she said \"I'm very anxious, lots of stress now  - Chronic LBP     5. ??Adverse reaction on reglan and morphine while in ED per ED doc -- however, she had been on these meds in the past.     Patient Okd with oxycodones.  She works as a CNA for 18 years    645A - Discussed with RN - POC, also reported still had some dizziness, Meclizine PRN     Patient agreeable for MRI. Did talk to Neuro rad - MR head and MRA head no contrast, neck MR with/without contrast       VTE prophylaxis --  SCDs  Diet --  NPO  Code Status -- Full  Barriers to discharge -- admitting clinical condition  Discharge Disposition and goals --  Unable to determine at this point, pending clinical progress and response to treatment. Patient may need transfer to SNF or ACR if unsafe to go home and needed treatment inappropriate at home setting " OR may need home health care evaluation if care can be delivered at home settings. Consider referral to care manager/    PPE - I was wearing PPE when I met the patient including but not limited to - N95 mask, Gloves, and/or Safety glasses.  Admission Status -- Observation     Care plan was created based on available information and patient's condition at the time of encounter. This was discussed with the patient and/or family members using layman's terms, including counseling/education and they have agreed to proceed. I recommend to revise care plan and to review history if there is change in condition and/or new clinical information that is not available during my encounter. At the end of night shift, this case will be presented to the [facility] Hospitalist.       75 minutes spent by me on the date of service doing chart review, history, exam, diagnostic test results interpretation, documentation & further activities per the note.      Andrea Corey MD, MPH, FACP, [facility], [facility]  Internal Medicine - Hospitalist        Chief Complaint Dizziness      HISTORY     - I met the patient in ED - 6. She was on her cell phone that jean. Did not appear in distress.    - She is here because of dizziness, when she turns her head, and when she moves. She said, her vision gets blurry when she is dizzy. No visual loss. She wears glasses. Currently, she is not dizzy and no blurred vision. Denies headaches or problems. No weakness.    - This dizziness had been going on for 3 days. She was able to go outside and smoke and was OK. Not dizzy or unstable      - She has chronic abdominal pain - she thinks her diverticulosis and not bothered to see MD again. Today pain at epigastric area was worse. No vomiting. No CP or SOB     - ED course - Lipase over a 1000. Had MR of head - incomplete study.     - ROS ---  No CP or SOB. No palpitations.  No urinary symptoms. No bleeding symptoms. No weight loss. Rest of 12 point ROS was  reviewed and negative.       Past Medical History     History reviewed. No pertinent past medical history.      Surgical History     Past Surgical History:   Procedure Laterality Date     SECTION      IR CYSTOGRAM  4/3/2018    KIDNEY STONE SURGERY      LAPAROSCOPIC CYSTECTOMY OVARIAN (ONCOLOGY)          Family History      Family History   Problem Relation Age of Onset    No Known Problems Mother     No Known Problems Father          Social History      .  Social History     Socioeconomic History    Marital status: Single     Spouse name: Not on file    Number of children: Not on file    Years of education: Not on file    Highest education level: Not on file   Occupational History    Not on file   Tobacco Use    Smoking status: Former     Packs/day: 0.10     Years: 10.00     Pack years: 1.00     Types: Cigarettes    Smokeless tobacco: Never    Tobacco comments:     3 months ago quit   Vaping Use    Vaping Use: Never used   Substance and Sexual Activity    Alcohol use: No    Drug use: No    Sexual activity: Yes     Partners: Male   Other Topics Concern    Not on file   Social History Narrative    Not on file     Social Determinants of Health     Financial Resource Strain: Not on file   Food Insecurity: Not on file   Transportation Needs: Not on file   Physical Activity: Not on file   Stress: Not on file   Social Connections: Not on file   Intimate Partner Violence: Not on file   Housing Stability: Not on file          Allergies        Allergies   Allergen Reactions    Vancomycin Itching     Complained of itching hands/arms after infusion completed.    Hydrocodone Hives and Nausea    Hydromorphone Itching    Amoxicillin Rash    Erythromycin Nausea and Vomiting and Rash    Vicodin [Hydrocodone-Acetaminophen] Rash         Prior to Admission Medications      No current facility-administered medications on file prior to encounter.  estradiol (VIVELLE-DOT) 0.05 MG/24HR bi-weekly patch, Apply 1 patch  topically twice a week Monday and Thursday  estradiol (VIVELLE-DOT) 0.1 MG/24HR bi-weekly patch, Place 1 patch onto the skin twice a week  fluconazole (DIFLUCAN) 150 MG tablet, Take 1 tablet by mouth and if symptoms do not resolve in 72 hrs take another tablet  gabapentin (NEURONTIN) 100 MG capsule, Take 100 mg by mouth  gabapentin (NEURONTIN) 300 MG capsule, Take 2 capsules (600 mg) by mouth At Bedtime  ibuprofen (ADVIL/MOTRIN) 600 MG tablet, Take 600 mg by mouth  methocarbamol (ROBAXIN) 500 MG tablet, Take 1-2 tablets (500-1,000 mg) by mouth 4 times daily as needed for muscle spasms  metoclopramide (REGLAN) 10 MG tablet, Take 1 tablet (10 mg) by mouth 4 times daily as needed (nausea or headache)  nitroFURantoin macrocrystal-monohydrate (MACROBID) 100 MG capsule, Take 1 capsule (100 mg) by mouth 2 times daily            Review of Systems     A 12 point comprehensive review of systems was negative except as noted above in HPI.    PHYSICAL EXAMINATION       Vitals      Vitals: /81   Pulse 68   Temp 97.2  F (36.2  C) (Temporal)   Resp 16   Ht 1.524 m (5')   Wt 81.6 kg (180 lb)   SpO2 97%   BMI 35.15 kg/m    BMI= Body mass index is 35.15 kg/m .      Examination     General Appearance:  Alert, cooperative, no distress  Head:    Normocephalic, without obvious abnormality, atraumatic  EENT:  PERRL, conjunctiva/corneas clear, EOM's intact.   Neck:   Supple, symmetrical, trachea midline, no adenopathy; no NVE  Back:  Symmetric, no curvature, no CVA tenderness  Chest/Lungs: Clear to auscultation bilaterally, respirations unlabored, No tenderness or deformity. No abdominal breathing or use of accessory muscles.   Heart:    Regular rate and rhythm, S1 and S2 normal, no murmur, rub   or gallop  Abdomen: Soft, non-tender, bowel sounds active all four quadrants, not peritoneal on palpation. Not distended  Extremities:  Extremities normal, atraumatic, no swelling   Skin:  Skin color, texture, turgor normal, no rashes  or lesion  Neurologic:  Awake and alert, Pupils 2-3 mm, EOMS full and equal, can close/open eyes, tongue midline, no facial droop, motor is 5/5, no sensory deficits.         Pertinent Lab     Results for orders placed or performed during the hospital encounter of 08/10/23   MR Brain w/o Contrast    Impression    IMPRESSION:  1.  Patient terminated the exam. Only a portion of the sequences were acquired.  2.  There is no restricted diffusion to indicate acute infarct.  3.  Normal ventricles. No mass effect.  4.  The imaged brain is unremarkable.   Abd/pelvis CT no contrast - Stone Protocol    Impression    IMPRESSION:   1.  No acute findings or inflammatory changes in the abdomen or pelvis.    2.  Colonic diverticulosis. No evidence of acute diverticulitis.     Comprehensive metabolic panel   Result Value Ref Range    Sodium 138 136 - 145 mmol/L    Potassium 4.1 3.5 - 5.0 mmol/L    Chloride 108 (H) 98 - 107 mmol/L    Carbon Dioxide (CO2) 22 22 - 31 mmol/L    Anion Gap 8 5 - 18 mmol/L    Urea Nitrogen 10 8 - 22 mg/dL    Creatinine 0.71 0.60 - 1.10 mg/dL    Calcium 8.6 8.5 - 10.5 mg/dL    Glucose 102 70 - 125 mg/dL    Alkaline Phosphatase 91 45 - 120 U/L     (H) 0 - 40 U/L    ALT 66 (H) 0 - 45 U/L    Protein Total 6.8 6.0 - 8.0 g/dL    Albumin 3.6 3.5 - 5.0 g/dL    Bilirubin Total 0.3 0.0 - 1.0 mg/dL    GFR Estimate >90 >60 mL/min/1.73m2   Result Value Ref Range    Lipase 1,168 (H) 0 - 52 U/L   CBC with platelets and differential   Result Value Ref Range    WBC Count 17.2 (H) 4.0 - 11.0 10e3/uL    RBC Count 4.49 3.80 - 5.20 10e6/uL    Hemoglobin 13.5 11.7 - 15.7 g/dL    Hematocrit 40.0 35.0 - 47.0 %    MCV 89 78 - 100 fL    MCH 30.1 26.5 - 33.0 pg    MCHC 33.8 31.5 - 36.5 g/dL    RDW 13.0 10.0 - 15.0 %    Platelet Count 389 150 - 450 10e3/uL   Extra Urine Collection   Result Value Ref Range    Hold Specimen JIC    Manual Differential   Result Value Ref Range    % Neutrophils 56 %    % Lymphocytes 34 %    %  Monocytes 9 %    % Eosinophils 1 %    % Basophils 0 %    Absolute Neutrophils 9.6 (H) 1.6 - 8.3 10e3/uL    Absolute Lymphocytes 5.8 (H) 0.8 - 5.3 10e3/uL    Absolute Monocytes 1.5 (H) 0.0 - 1.3 10e3/uL    Absolute Eosinophils 0.2 0.0 - 0.7 10e3/uL    Absolute Basophils 0.0 0.0 - 0.2 10e3/uL    RBC Morphology Confirmed RBC Indices     Platelet Assessment  Automated Count Confirmed. Platelet morphology is normal.     Automated Count Confirmed. Platelet morphology is normal.    Reactive Lymphocytes Present (A) None Seen           Pertinent Radiology

## 2023-08-11 NOTE — UTILIZATION REVIEW
Admission Status; Secondary Review Determination   Under the authority of the Utilization Management Committee, the utilization review process indicated a secondary review on Dayana Mcclain. The review outcome is based on review of the medical records, discussions with staff, and applying clinical experience noted on the date of the review.   (x) Inpatient Status Appropriate - This patient's medical care is consistent with medical management for inpatient care and reasonable inpatient medical practice.     RATIONALE FOR DETERMINATION   37-year-old female admitted with abdominal pain secondary to pancreatitis with lipase of 1168.  Other LFTs also elevated and white count was 17.2 on admission.  Also had dizziness and is undergoing brain MRI for evaluation of this.  GI consulted, continue IV fluids at 150 cc an hour and is remaining n.p.o.    At the time of admission with the information available to the attending physician more than 2 nights Hospital complex care was anticipated, based on patient risk of adverse outcome if treated as outpatient and complex care required. Inpatient admission is appropriate based on the Medicare guidelines.   The information on this document is developed by the utilization review team in order for the business office to ensure compliance. This only denotes the appropriateness of proper admission status and does not reflect the quality of care rendered.   The definitions of Inpatient Status and Observation Status used in making the determination above are those provided in the CMS Coverage Manual, Chapter 1 and Chapter 6, section 70.4.   Sincerely,   Jose Bradford MD  Utilization Review  Physician Advisor  [facility]

## 2023-08-11 NOTE — PROGRESS NOTES
[facility] MEDICINE  PROGRESS NOTE     Code Status: Full Code       Identification/Summary:   Dayana Mcclain is a 37 year old female with a PMH of cholecystectomy, diverticulitis, anxiety, hepatic hemangioma, endometriosis, LBP.  8/10/2023 admitted for abdominal pain with lipase 1168 and vertigo.  Treating with aggressive LR and n.p.o. status.  Pain has improved.  Lipase down to 85.  Abdominal imaging unremarkable.  Denies heavy alcohol use.  MRI MRA head and neck negative.  Treating vertigo with scheduled meclizine.  [facility] consulted.    Assessment and Plan:    Acute pancreatitis  Elevated lipase   History of cholecystectomy  Denies heavy ETOH use.  Abdominal CT and ultrasound unremarkable.    Keep NPO.  LR at 150 mL/h.  Oxycodone and Tylenol as needed.  Appreciate [facility] consultation.  Benign positional vertigo  MRI/MRA head and neck negative.  Utilize scheduled meclizine.  Fall precautions.  History of hepatic hemangioma  Ultrasound of liver showed 1.7 cm lesion.  This certainly could be the hemangioma.  We will have patient follow-up with her primary to address as an outpatient.  Anxiety  Initially patient was quite reluctant to get MRI.  Eventually was agreeable.  Continue home bedtime gabapentin.  Left breast lump  Patient scheduled to have mammogram and fine-needle aspiration next week.      Anticoagulation   Low Risk/Ambulatory with no VTE prophylaxis indicated    COVID-19 PCR not tested per current policy  Noted  Fluids: LR at 150  Pain meds: Oxycodone and Tylenol  Therapy: N/A  Fields:Not present  Lines: None       Current Diet  Orders Placed This Encounter      NPO for Medical/Clinical Reasons Except for: Meds, Ice Chips    Supplements  None    Barriers to Discharge: Abdominal pain, IV fluids, vertigo    Disposition: Hopeful discharge 1 to 2 days    Clinically Significant Risk Factors Present on Admission              # Hypoalbuminemia: Lowest albumin =  3.1 g/dL at 8/11/2023  5:46 AM, will monitor as appropriate          # Obesity: Estimated body mass index is 35.15 kg/m  as calculated from the following:    Height as of this encounter: 1.524 m (5').    Weight as of this encounter: 81.6 kg (180 lb).            Interval History/Subjective:  Patient feeling better compared to time of admission.  Rates her pain a 4 out of 10.  Mostly in the epigastric and left upper quadrant space.  Vertigo is less severe.  If she changes positions slowly then she can manage going to and from the bathroom.  Denies vertigo history.  Did have her gallbladder removed a number of years ago.  Mother is present and supportive.  Multiple questions answered to verbalized satisfaction.      Last 24H PRN:     LORazepam (ATIVAN) half-tab 0.25 mg, 0.25 mg at 08/11/23 0221    oxyCODONE (ROXICODONE) tablet 5-10 mg, 5 mg at 08/11/23 1208    Physical Exam/Objective:  Temp:  [97.2  F (36.2  C)-98.2  F (36.8  C)] 98.2  F (36.8  C)  Pulse:  [67-82] 67  Resp:  [16-20] 20  BP: (103-122)/(64-86) 113/65  SpO2:  [89 %-100 %] 97 %  Wt Readings from Last 4 Encounters:   08/10/23 81.6 kg (180 lb)   05/30/23 79.4 kg (175 lb)   08/10/22 74.8 kg (165 lb)   03/18/22 77.6 kg (171 lb)     Body mass index is 35.15 kg/m .    Constitutional: awake, alert, cooperative, no apparent distress, and appears stated age and moderately obese  ENT: Normocephalic, without obvious abnormality, atraumatic, external ears without lesions, oral pharynx with moist mucous membranes, tonsils without erythema or exudates, gums normal and good dentition.  Negative otoscopic exam to ears bilaterally.  Respiratory: No increased work of breathing, good air exchange, clear to auscultation bilaterally, no crackles or wheezing  Cardiovascular: Normal apical impulse, regular rate and rhythm, normal S1 and S2, no S3 or S4, and no murmur noted  GI: Positive bowel sounds soft nondistended.  Does have moderate epigastric and left upper quadrant  tenderness to palpation.  Skin: normal skin color, texture, turgor, no redness, warmth, or swelling, and no rashes  Musculoskeletal: There is no redness, warmth, or swelling of the joints.  Motor strength is 5 out of 5 all extremities bilaterally.  Tone is normal. no lower extremity pitting edema present  Neurologic: Cranial nerves II-XII are grossly intact. Sensory:  Sensory intact  Neuropsychiatric: General: normal, calm, and normal eye contact Level of consciousness: alert / normal Affect: normal Orientation: oriented to self, place, time and situation Memory and insight: normal, memory for past and recent events intact, and thought process normal      Medications:   Personally Reviewed.  Medications    lactated ringers 150 mL/hr at 08/11/23 1202      estradiol  1 patch Topical Once per day on Mon Thu    estradiol biweekly   Transdermal Q8H SCHUYLER    gabapentin  100 mg Oral At Bedtime    sodium chloride (PF)  3 mL Intracatheter Q8H       Data reviewed today: I personally reviewed all new medications, labs, imaging/diagnostics reports over the past 24 hours. Pertinent findings include:    Imaging:   Recent Results (from the past 24 hour(s))   MA Diagnostic Bilateral w/Pao    Narrative    EXAM: MA DIAGNOSTIC BILATERAL W/ PAO, US BREAST LEFT LIMITED 1-3   QUADRANTS  LOCATION: [facility]  DATE: 8/10/2023    INDICATION: 37-year-old female presents with a palpable lump within the   left breast for approximately one week. Patient also reports chronic,   nonspontaneous, clear and milky left nipple discharge for approximately   one year. Patient denies any episodes of   bloody nipple discharge.  COMPARISON: None. Baseline examination.    MAMMOGRAPHIC FINDINGS: Bilateral full-field digital diagnostic mammograms   performed. The breasts are heterogeneously dense, which may obscure small   masses. Images evaluated with the assistance of CAD. Breast tomosynthesis   was used in interpretation.     A JESSICA  marker was placed on the skin overlying the area of palpable concern   within the left breast. There is an underlying oval mass with   circumscribed margins. Recommend targeted left breast ultrasound at the   area of palpable concern for further   evaluation. The remaining left breast parenchyma is unremarkable.    There are no suspicious masses, calcifications, or architectural   distortion within the right breast.    ULTRASOUND FINDINGS: Patient directed physical examination of the palpable   area of concern demonstrates an approximately 2.0 cm oval, soft, mobile   mass located immediately superior to the left nipple.    Targeted left breast ultrasound at the area of palpable concern at the   1:00 position, 1 cm from the nipple demonstrates a 1.7 x 1.3 x 1.8 cm   oval, hypoechoic mass with circumscribed margins and associated posterior   acoustic enhancement. There is no   significant internal vascularity. This finding may also be located within   a duct.      Impression    IMPRESSION:   1.  Oval, hypoechoic mass within the left breast at the 1:00 position   correlates with the palpable area of concern. Differential diagnosis   includes fibroadenoma versus papilloma, as this mass may in fact be   located within a duct. Recommend   ultrasound-guided biopsy for definitive pathologic diagnosis.    ACR BI-RADS Category 4: Suspicious.    I personally scanned and discussed the findings and recommendations with   the patient , and the patient's mother, at the conclusion of the   examination.     US Breast Left    Narrative    EXAM: MA DIAGNOSTIC BILATERAL W/ PAO, US BREAST LEFT LIMITED 1-3   QUADRANTS  LOCATION: [facility]  DATE: 8/10/2023    INDICATION: 37-year-old female presents with a palpable lump within the   left breast for approximately one week. Patient also reports chronic,   nonspontaneous, clear and milky left nipple discharge for approximately   one year. Patient denies any  episodes of   bloody nipple discharge.  COMPARISON: None. Baseline examination.    MAMMOGRAPHIC FINDINGS: Bilateral full-field digital diagnostic mammograms   performed. The breasts are heterogeneously dense, which may obscure small   masses. Images evaluated with the assistance of CAD. Breast tomosynthesis   was used in interpretation.     A BB marker was placed on the skin overlying the area of palpable concern   within the left breast. There is an underlying oval mass with   circumscribed margins. Recommend targeted left breast ultrasound at the   area of palpable concern for further   evaluation. The remaining left breast parenchyma is unremarkable.    There are no suspicious masses, calcifications, or architectural   distortion within the right breast.    ULTRASOUND FINDINGS: Patient directed physical examination of the palpable   area of concern demonstrates an approximately 2.0 cm oval, soft, mobile   mass located immediately superior to the left nipple.    Targeted left breast ultrasound at the area of palpable concern at the   1:00 position, 1 cm from the nipple demonstrates a 1.7 x 1.3 x 1.8 cm   oval, hypoechoic mass with circumscribed margins and associated posterior   acoustic enhancement. There is no   significant internal vascularity. This finding may also be located within   a duct.      Impression    IMPRESSION:   1.  Oval, hypoechoic mass within the left breast at the 1:00 position   correlates with the palpable area of concern. Differential diagnosis   includes fibroadenoma versus papilloma, as this mass may in fact be   located within a duct. Recommend   ultrasound-guided biopsy for definitive pathologic diagnosis.    ACR BI-RADS Category 4: Suspicious.    I personally scanned and discussed the findings and recommendations with   the patient , and the patient's mother, at the conclusion of the   examination.     MR Brain w/o Contrast    Narrative    EXAM: MR BRAIN W/O CONTRAST  LOCATION: [facility]  DATE: 8/10/2023    INDICATION: Migraine headache with dizziness, difficulty walking and altered gait.  COMPARISON: None.  TECHNIQUE: Brain MRI without intravenous contrast. Diffusion, sagittal T1 and partial acquisition of the axial T2 FLAIR was performed. Patient aborted the exam.    FINDINGS:  There is no restricted diffusion. Paranasal sinuses are free from significant disease. Mastoid air cells appear free from significant disease. Intraorbital contents are unremarkable. Ventricles are within normal limits in size for the patient's age.   There is no mass effect, midline shift, or extraaxial collection. Signal intensity of the imaged brain parenchyma is within normal limits for the patient's age.      Impression    IMPRESSION:  1.  Patient terminated the exam. Only a portion of the sequences were acquired.  2.  There is no restricted diffusion to indicate acute infarct.  3.  Normal ventricles. No mass effect.  4.  The imaged brain is unremarkable.   Abd/pelvis CT no contrast - Stone Protocol    Narrative    EXAM: CT ABDOMEN PELVIS W/O CONTRAST  LOCATION: [facility]  DATE: 8/10/2023    INDICATION: Abdominal pain.  COMPARISON: 05/30/2023, 08/09/2022  TECHNIQUE: CT scan of the abdomen and pelvis was performed without IV contrast. Multiplanar reformats were obtained. Dose reduction techniques were used.  CONTRAST: None.    FINDINGS:   LOWER CHEST: Unremarkable.    HEPATOBILIARY: Right hepatic lobe 1.7 x 1.2 cm low-attenuation lesion (series 3, image 23), corresponding with a subtly enhancing lesion on prior CTs, not significantly changed since 08/09/2022, most likely benign hemangioma. Status post cholecystectomy.    PANCREAS: Normal.    SPLEEN: Normal.    ADRENAL GLANDS: Normal.    KIDNEYS/BLADDER: Normal.    BOWEL: Mild colonic diverticulosis. No obstruction or inflammatory change. Normal appendix. No evidence of acute appendicitis.    LYMPH NODES: No  lymphadenopathy.    VASCULATURE: Unremarkable.    PELVIC ORGANS: Status post hysterectomy.    MUSCULOSKELETAL: Unremarkable.      Impression    IMPRESSION:   1.  No acute findings or inflammatory changes in the abdomen or pelvis.    2.  Colonic diverticulosis. No evidence of acute diverticulitis.     US Abdomen Limited    Narrative    EXAM: US ABDOMEN LIMITED  LOCATION: [facility]  DATE: 8/11/2023    INDICATION: Eval RUQ  elevated lipase and pain  COMPARISON: 08/10/2023  TECHNIQUE: Limited abdominal ultrasound.    FINDINGS:    GALLBLADDER: Surgically removed.    BILE DUCTS: No biliary dilatation. The common duct measures 6 mm.    LIVER: Normal parenchyma with smooth contour. Small hypoechoic lesion measuring 1.7 x 1.7 x 1.4 cm.    RIGHT KIDNEY: No hydronephrosis. Small 1.2 cm cyst.    PANCREAS: The visualized portions are normal.    No ascites.      Impression    IMPRESSION:  1.  Small hypoechoic lesion in the liver measuring up to 1.7 cm. MRI is recommended.       MR Brain w/o & w Contrast    Narrative    1. HEAD MRI WITHOUT AND WITH IV CONTRAST  2. HEAD MRA WITHOUT IV CONTRAST   3. NECK MRA WITHOUT AND WITH IV CONTRAST   8/11/2023 11:49 AM CDT      INDICATION: dizziness, headaches,unsteady gait, new left breast lump on going eval; Headache; Neurologic deficit, non traumatic; Visual symptoms; Neurologic deficit onset <= 24 hours; No known automatically detected potential contraindications to   iodinated contrast  TECHNIQUE:  1. Head MRI without and with IV contrast.  2. Head MRA without IV contrast.  3. Neck MRA without and with IV contrast including gadolinium bolus sequence.  CONTRAST: 10 ml Gadavist given  COMPARISON: Brain MRI 08/10/2023    FINDINGS:  HEAD MRI: There is no restricted diffusion. Paranasal sinuses are free from significant disease. Mastoid air cells appear free from significant disease. Intraorbital contents are unremarkable. Ventricles are within normal limits in size  for the patient's   age. Intracranial flow voids are intact. There is no mass effect, midline shift, or extraaxial collection. Signal intensity of the brain parenchyma is within normal limits for the patient's age. No evidence for acute or chronic intracranial blood   products.    HEAD MRA: Intracranial circulation is patent without evidence for aneurysm, significant intracranial stenosis, or high-flow vascular malformation. Vertebrobasilar system is unremarkable.    NECK MRA: Left vertebral artery arises from the aortic arch. Antegrade flow within both carotid and vertebral arteries. No evidence for dissection or hemodynamically significant narrowing in the neck by NASCET criteria.       Impression    CONCLUSION:  HEAD MRI:  1.  No acute intracranial finding. No evidence for recent ischemia, intracranial hemorrhage, or mass.    HEAD MRA:  1.  Negative MR angiogram of the brain. No evidence for aneurysm, proximal vessel occlusion, high-grade intracranial stenosis or high flow vascular lesion.    NECK MRA:  1.  No evidence for dissection or hemodynamically significant narrowing in the neck based on NASCET criteria.   MRA Brain ([place]) wo Contrast    Narrative    1. HEAD MRI WITHOUT AND WITH IV CONTRAST  2. HEAD MRA WITHOUT IV CONTRAST   3. NECK MRA WITHOUT AND WITH IV CONTRAST   8/11/2023 11:49 AM CDT      INDICATION: dizziness, headaches,unsteady gait, new left breast lump on going eval; Headache; Neurologic deficit, non traumatic; Visual symptoms; Neurologic deficit onset <= 24 hours; No known automatically detected potential contraindications to   iodinated contrast  TECHNIQUE:  1. Head MRI without and with IV contrast.  2. Head MRA without IV contrast.  3. Neck MRA without and with IV contrast including gadolinium bolus sequence.  CONTRAST: 10 ml Gadavist given  COMPARISON: Brain MRI 08/10/2023    FINDINGS:  HEAD MRI: There is no restricted diffusion. Paranasal sinuses are free from significant  disease. Mastoid air cells appear free from significant disease. Intraorbital contents are unremarkable. Ventricles are within normal limits in size for the patient's   age. Intracranial flow voids are intact. There is no mass effect, midline shift, or extraaxial collection. Signal intensity of the brain parenchyma is within normal limits for the patient's age. No evidence for acute or chronic intracranial blood   products.    HEAD MRA: Intracranial circulation is patent without evidence for aneurysm, significant intracranial stenosis, or high-flow vascular malformation. Vertebrobasilar system is unremarkable.    NECK MRA: Left vertebral artery arises from the aortic arch. Antegrade flow within both carotid and vertebral arteries. No evidence for dissection or hemodynamically significant narrowing in the neck by NASCET criteria.       Impression    CONCLUSION:  HEAD MRI:  1.  No acute intracranial finding. No evidence for recent ischemia, intracranial hemorrhage, or mass.    HEAD MRA:  1.  Negative MR angiogram of the brain. No evidence for aneurysm, proximal vessel occlusion, high-grade intracranial stenosis or high flow vascular lesion.    NECK MRA:  1.  No evidence for dissection or hemodynamically significant narrowing in the neck based on NASCET criteria.   MRA Neck (Carotids) wo & w Contrast    Narrative    1. HEAD MRI WITHOUT AND WITH IV CONTRAST  2. HEAD MRA WITHOUT IV CONTRAST   3. NECK MRA WITHOUT AND WITH IV CONTRAST   8/11/2023 11:49 AM CDT      INDICATION: dizziness, headaches,unsteady gait, new left breast lump on going eval; Headache; Neurologic deficit, non traumatic; Visual symptoms; Neurologic deficit onset <= 24 hours; No known automatically detected potential contraindications to   iodinated contrast  TECHNIQUE:  1. Head MRI without and with IV contrast.  2. Head MRA without IV contrast.  3. Neck MRA without and with IV contrast including gadolinium bolus sequence.  CONTRAST: 10 ml Gadavist  given  COMPARISON: Brain MRI 08/10/2023    FINDINGS:  HEAD MRI: There is no restricted diffusion. Paranasal sinuses are free from significant disease. Mastoid air cells appear free from significant disease. Intraorbital contents are unremarkable. Ventricles are within normal limits in size for the patient's   age. Intracranial flow voids are intact. There is no mass effect, midline shift, or extraaxial collection. Signal intensity of the brain parenchyma is within normal limits for the patient's age. No evidence for acute or chronic intracranial blood   products.    HEAD MRA: Intracranial circulation is patent without evidence for aneurysm, significant intracranial stenosis, or high-flow vascular malformation. Vertebrobasilar system is unremarkable.    NECK MRA: Left vertebral artery arises from the aortic arch. Antegrade flow within both carotid and vertebral arteries. No evidence for dissection or hemodynamically significant narrowing in the neck by NASCET criteria.       Impression    CONCLUSION:  HEAD MRI:  1.  No acute intracranial finding. No evidence for recent ischemia, intracranial hemorrhage, or mass.    HEAD MRA:  1.  Negative MR angiogram of the brain. No evidence for aneurysm, proximal vessel occlusion, high-grade intracranial stenosis or high flow vascular lesion.    NECK MRA:  1.  No evidence for dissection or hemodynamically significant narrowing in the neck based on NASCET criteria.       Labs:  MRA Neck (Carotids) wo & w Contrast   Final Result   CONCLUSION:   HEAD MRI:   1.  No acute intracranial finding. No evidence for recent ischemia, intracranial hemorrhage, or mass.      HEAD MRA:   1.  Negative MR angiogram of the brain. No evidence for aneurysm, proximal vessel occlusion, high-grade intracranial stenosis or high flow vascular lesion.      NECK MRA:   1.  No evidence for dissection or hemodynamically significant narrowing in the neck based on NASCET criteria.      MRA Brain ([place]) wo Contrast   Final Result   CONCLUSION:   HEAD MRI:   1.  No acute intracranial finding. No evidence for recent ischemia, intracranial hemorrhage, or mass.      HEAD MRA:   1.  Negative MR angiogram of the brain. No evidence for aneurysm, proximal vessel occlusion, high-grade intracranial stenosis or high flow vascular lesion.      NECK MRA:   1.  No evidence for dissection or hemodynamically significant narrowing in the neck based on NASCET criteria.      MR Brain w/o & w Contrast   Final Result   CONCLUSION:   HEAD MRI:   1.  No acute intracranial finding. No evidence for recent ischemia, intracranial hemorrhage, or mass.      HEAD MRA:   1.  Negative MR angiogram of the brain. No evidence for aneurysm, proximal vessel occlusion, high-grade intracranial stenosis or high flow vascular lesion.      NECK MRA:   1.  No evidence for dissection or hemodynamically significant narrowing in the neck based on NASCET criteria.      US Abdomen Limited   Final Result   IMPRESSION:   1.  Small hypoechoic lesion in the liver measuring up to 1.7 cm. MRI is recommended.            Abd/pelvis CT no contrast - Stone Protocol   Final Result   IMPRESSION:    1.  No acute findings or inflammatory changes in the abdomen or pelvis.      2.  Colonic diverticulosis. No evidence of acute diverticulitis.         MR Brain w/o Contrast   Final Result   IMPRESSION:   1.  Patient terminated the exam. Only a portion of the sequences were acquired.   2.  There is no restricted diffusion to indicate acute infarct.   3.  Normal ventricles. No mass effect.   4.  The imaged brain is unremarkable.        Recent Results (from the past 24 hour(s))   CBC with platelets and differential    Collection Time: 08/10/23  6:50 PM   Result Value Ref Range    WBC Count 17.2 (H) 4.0 - 11.0 10e3/uL    RBC Count 4.49 3.80 - 5.20 10e6/uL    Hemoglobin 13.5 11.7 - 15.7 g/dL    Hematocrit 40.0 35.0 - 47.0 %    MCV 89 78 - 100 fL    MCH 30.1 26.5 - 33.0 pg     MCHC 33.8 31.5 - 36.5 g/dL    RDW 13.0 10.0 - 15.0 %    Platelet Count 389 150 - 450 10e3/uL   Extra Urine Collection    Collection Time: 08/10/23  6:50 PM   Result Value Ref Range    Hold Specimen JI    Manual Differential    Collection Time: 08/10/23  6:50 PM   Result Value Ref Range    % Neutrophils 56 %    % Lymphocytes 34 %    % Monocytes 9 %    % Eosinophils 1 %    % Basophils 0 %    Absolute Neutrophils 9.6 (H) 1.6 - 8.3 10e3/uL    Absolute Lymphocytes 5.8 (H) 0.8 - 5.3 10e3/uL    Absolute Monocytes 1.5 (H) 0.0 - 1.3 10e3/uL    Absolute Eosinophils 0.2 0.0 - 0.7 10e3/uL    Absolute Basophils 0.0 0.0 - 0.2 10e3/uL    RBC Morphology Confirmed RBC Indices     Platelet Assessment  Automated Count Confirmed. Platelet morphology is normal.     Automated Count Confirmed. Platelet morphology is normal.    Reactive Lymphocytes Present (A) None Seen   Comprehensive metabolic panel    Collection Time: 08/10/23  7:45 PM   Result Value Ref Range    Sodium 138 136 - 145 mmol/L    Potassium 4.1 3.5 - 5.0 mmol/L    Chloride 108 (H) 98 - 107 mmol/L    Carbon Dioxide (CO2) 22 22 - 31 mmol/L    Anion Gap 8 5 - 18 mmol/L    Urea Nitrogen 10 8 - 22 mg/dL    Creatinine 0.71 0.60 - 1.10 mg/dL    Calcium 8.6 8.5 - 10.5 mg/dL    Glucose 102 70 - 125 mg/dL    Alkaline Phosphatase 91 45 - 120 U/L     (H) 0 - 40 U/L    ALT 66 (H) 0 - 45 U/L    Protein Total 6.8 6.0 - 8.0 g/dL    Albumin 3.6 3.5 - 5.0 g/dL    Bilirubin Total 0.3 0.0 - 1.0 mg/dL    GFR Estimate >90 >60 mL/min/1.73m2   Lipase    Collection Time: 08/10/23  7:45 PM   Result Value Ref Range    Lipase 1,168 (H) 0 - 52 U/L   Comprehensive metabolic panel    Collection Time: 08/11/23  5:46 AM   Result Value Ref Range    Sodium 137 136 - 145 mmol/L    Potassium 3.9 3.5 - 5.0 mmol/L    Chloride 109 (H) 98 - 107 mmol/L    Carbon Dioxide (CO2) 24 22 - 31 mmol/L    Anion Gap 4 (L) 5 - 18 mmol/L    Urea Nitrogen 7 (L) 8 - 22 mg/dL    Creatinine 0.60 0.60 - 1.10 mg/dL     Calcium 8.3 (L) 8.5 - 10.5 mg/dL    Glucose 95 70 - 125 mg/dL    Alkaline Phosphatase 90 45 - 120 U/L     (H) 0 - 40 U/L     (H) 0 - 45 U/L    Protein Total 5.7 (L) 6.0 - 8.0 g/dL    Albumin 3.1 (L) 3.5 - 5.0 g/dL    Bilirubin Total 0.5 0.0 - 1.0 mg/dL    GFR Estimate >90 >60 mL/min/1.73m2   Magnesium Lab    Collection Time: 08/11/23  5:46 AM   Result Value Ref Range    Magnesium 1.9 1.8 - 2.6 mg/dL   Triglycerides    Collection Time: 08/11/23  5:46 AM   Result Value Ref Range    Triglycerides 97 <=149 mg/dL   Lipase    Collection Time: 08/11/23  5:46 AM   Result Value Ref Range    Lipase 85 (H) 0 - 52 U/L   CBC with platelets and differential    Collection Time: 08/11/23  5:46 AM   Result Value Ref Range    WBC Count 11.8 (H) 4.0 - 11.0 10e3/uL    RBC Count 4.20 3.80 - 5.20 10e6/uL    Hemoglobin 12.4 11.7 - 15.7 g/dL    Hematocrit 37.3 35.0 - 47.0 %    MCV 89 78 - 100 fL    MCH 29.5 26.5 - 33.0 pg    MCHC 33.2 31.5 - 36.5 g/dL    RDW 13.1 10.0 - 15.0 %    Platelet Count 324 150 - 450 10e3/uL    % Neutrophils 62 %    % Lymphocytes 27 %    % Monocytes 10 %    % Eosinophils 1 %    % Basophils 0 %    % Immature Granulocytes 0 %    NRBCs per 100 WBC 0 <1 /100    Absolute Neutrophils 7.3 1.6 - 8.3 10e3/uL    Absolute Lymphocytes 3.2 0.8 - 5.3 10e3/uL    Absolute Monocytes 1.1 0.0 - 1.3 10e3/uL    Absolute Eosinophils 0.1 0.0 - 0.7 10e3/uL    Absolute Basophils 0.0 0.0 - 0.2 10e3/uL    Absolute Immature Granulocytes 0.0 <=0.4 10e3/uL    Absolute NRBCs 0.0 10e3/uL       Pending Labs:  Unresulted Labs Ordered in the Past 30 Days of this Admission       No orders found for last 31 day(s).              Marcus Franco MD  [facility]  [facility]  Phone: #[phone]

## 2023-08-11 NOTE — PLAN OF CARE
Patient having some abdominal pain but wanted to hold off on pain meds until after family visits this evening. Received meclizine for dizziness this evening with no improvement of symptoms. Vitals stable. Started on a clear liquid diet and tolerated well. No nausea or vomiting.

## 2023-08-11 NOTE — ED NOTES
"Patient given permission by Dr. Esteban to go outside in patients car with her IV intact to smoke. Patient proceeded to go outside with mom and have a cigarette. Patient questioned by security regarding why she is leaving with an IV to go outside and smoke. Patient got angry and said \"I am leaving.\" [email or web address]  "

## 2023-08-11 NOTE — PHARMACY-ADMISSION MEDICATION HISTORY
Pharmacist Admission Medication History    Admission medication history is complete. The information provided in this note is only as accurate as the sources available at the time of the update.    Medication reconciliation/reorder completed by provider prior to medication history? No    Information Source(s): Patient and CareEverywhere/SureScripts via in-person    Pertinent Information: N/A    Changes made to PTA medication list:  Added: None  Deleted: fluconazole, robaxin, reglan, macrobid  Changed: None    Medication Affordability:  Not including over the counter (OTC) medications, was there a time in the past 3 months when you did not take your medications as prescribed because of cost?: No    Allergies reviewed with patient and updates made in EHR: yes    Medication History Completed By: Ethan Martines [facility] 8/10/2023 9:06 PM    Prior to Admission medications    Medication Sig Last Dose Taking? Auth Provider Long Term End Date   estradiol (VIVELLE-DOT) 0.05 MG/24HR bi-weekly patch Apply 1 patch topically twice a week Monday and Thursday off now d/t MRI Yes Unknown, Entered By History Yes    gabapentin (NEURONTIN) 100 MG capsule Take 100 mg by mouth At Bedtime 8/9/2023 at PM Yes Reported, Patient Yes

## 2023-08-11 NOTE — ED NOTES
Patient transported to MRI with MRI staff via wheelchair. Checklist sent with. ...Madison Andrade RN

## 2023-08-11 NOTE — ED NOTES
Bed: WWED-06  Expected date: 8/10/23  Expected time: 9:42 PM  Means of arrival:   Comments:  Held for boarding 18

## 2023-08-12 VITALS
BODY MASS INDEX: 36.2 KG/M2 | TEMPERATURE: 98.5 F | OXYGEN SATURATION: 96 % | WEIGHT: 184.4 LBS | HEART RATE: 63 BPM | HEIGHT: 60 IN | RESPIRATION RATE: 18 BRPM | DIASTOLIC BLOOD PRESSURE: 66 MMHG | SYSTOLIC BLOOD PRESSURE: 105 MMHG

## 2023-08-12 PROCEDURE — 250N000013 HC RX MED GY IP 250 OP 250 PS 637: Performed by: FAMILY MEDICINE

## 2023-08-12 PROCEDURE — 250N000013 HC RX MED GY IP 250 OP 250 PS 637: Performed by: INTERNAL MEDICINE

## 2023-08-12 PROCEDURE — 258N000003 HC RX IP 258 OP 636: Performed by: INTERNAL MEDICINE

## 2023-08-12 PROCEDURE — 99239 HOSP IP/OBS DSCHRG MGMT >30: CPT | Performed by: FAMILY MEDICINE

## 2023-08-12 RX ORDER — LORAZEPAM 0.5 MG/1
0.25 TABLET ORAL EVERY 8 HOURS PRN
Qty: 10 TABLET | Refills: 0 | Status: SHIPPED | OUTPATIENT
Start: 2023-08-12 | End: 2023-10-04

## 2023-08-12 RX ORDER — ACETAMINOPHEN 325 MG/1
650 TABLET ORAL EVERY 4 HOURS PRN
COMMUNITY
Start: 2023-08-12

## 2023-08-12 RX ORDER — OXYCODONE HYDROCHLORIDE 5 MG/1
5 TABLET ORAL EVERY 6 HOURS PRN
Qty: 20 TABLET | Refills: 0 | Status: SHIPPED | OUTPATIENT
Start: 2023-08-12 | End: 2023-09-21

## 2023-08-12 RX ORDER — MECLIZINE HYDROCHLORIDE 25 MG/1
25 TABLET ORAL EVERY 6 HOURS
Qty: 40 TABLET | Refills: 0 | Status: SHIPPED | OUTPATIENT
Start: 2023-08-12 | End: 2023-10-04

## 2023-08-12 RX ADMIN — SODIUM CHLORIDE, POTASSIUM CHLORIDE, SODIUM LACTATE AND CALCIUM CHLORIDE: 600; 310; 30; 20 INJECTION, SOLUTION INTRAVENOUS at 02:26

## 2023-08-12 RX ADMIN — SODIUM CHLORIDE, POTASSIUM CHLORIDE, SODIUM LACTATE AND CALCIUM CHLORIDE: 600; 310; 30; 20 INJECTION, SOLUTION INTRAVENOUS at 09:19

## 2023-08-12 RX ADMIN — MECLIZINE HYDROCHLORIDE 25 MG: 25 TABLET ORAL at 00:17

## 2023-08-12 RX ADMIN — OXYCODONE HYDROCHLORIDE 5 MG: 5 TABLET ORAL at 00:17

## 2023-08-12 RX ADMIN — MECLIZINE HYDROCHLORIDE 25 MG: 25 TABLET ORAL at 11:37

## 2023-08-12 RX ADMIN — LORAZEPAM 0.25 MG: 0.5 TABLET ORAL at 06:25

## 2023-08-12 RX ADMIN — MECLIZINE HYDROCHLORIDE 25 MG: 25 TABLET ORAL at 06:21

## 2023-08-12 ASSESSMENT — ACTIVITIES OF DAILY LIVING (ADL)
ADLS_ACUITY_SCORE: 22

## 2023-08-12 NOTE — DISCHARGE SUMMARY
[facility] MEDICINE  DISCHARGE SUMMARY     Primary Care Physician: Debbi Burgos  Admission Date: 8/10/2023   Discharge Provider: Marcus Franco MD Discharge Date: 8/12/2023    Diet:   Active Diet and Nourishment Order   Procedures    Low Fat Diet    Diet     Code Status: Full Code   Activity: DCACTIVITY: Activity as tolerated  Off of work for 1 week      Condition at Discharge: Stable     REASON FOR PRESENTATION(See Admission Note for Details)   Abdominal pain with vertigo    PRINCIPAL & ACTIVE DISCHARGE DIAGNOSES     Principal Problem:    Acute pancreatitis without infection or necrosis, unspecified pancreatitis type  Active Problems:    Dizziness    Anxiety    Hepatic hemangioma    BPPV (benign paroxysmal positional vertigo)  Left breast lump    Clinically Significant Risk Factors              # Hypoalbuminemia: Lowest albumin = 3.1 g/dL at 8/11/2023  5:46 AM, will monitor as appropriate            # Obesity: Estimated body mass index is 36.01 kg/m  as calculated from the following:    Height as of this encounter: 1.524 m (5').    Weight as of this encounter: 83.6 kg (184 lb 6.4 oz)., PRESENT ON ADMISSION          PENDING LABS     Unresulted Labs Ordered in the Past 30 Days of this Admission       No orders found from 7/11/2023 to 8/11/2023.          PROCEDURES ( this hospitalization only)      None    RECOMMENDATIONS TO OUTPATIENT PROVIDER FOR F/U VISIT   Follow-up Appointments     Follow-up and recommended labs and tests       1.  Follow-up with primary care provider within 1 week.  2.  If continues to have vertigo issues could follow-up with vestibular   therapy.  3.  Review historical records with primary care provider to determine if   identified liver lesion 1.7 cm correlates with her previous hemangioma.    Goal is to achieve 2 years of stability.              DISPOSITION     Home    SUMMARY OF HOSPITAL COURSE:      Dayana Mcclain is a 37 year old female with  a PMH of cholecystectomy, diverticulitis, anxiety, hepatic hemangioma, endometriosis, LBP.  8/10/2023 admitted for abdominal pain with lipase 1168 and vertigo.      1.  GI.  Treating with aggressive LR and n.p.o. status.  Pain has improved.  Lipase down to 85.  Abdominal imaging unremarkable.  Denies heavy alcohol use.  [facility] consulted.  Findings could be consistent with gallstone pancreatitis that has the stone passing.  Patient tolerating bland low-fat diet.  Should avoid alcohol use.  Can follow-up with GI as needed especially if having recurrent symptoms.    2.  Vertigo.  MRI MRA head and neck negative.  Treating vertigo with scheduled meclizine and patient is feeling better.  Reasonable ambulation and feels comfortable going home.  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  If continues to have issues consult was ordered for vestibular therapy.    3.  Liver lesion.  Incidental liver lesion 1.7 cm noted on imaging.  Patient does have a history of a hepatic hemangioma.  Recommend primary care evaluation/comparison with previous imaging to determine if this is stable.    4.  Anxiety.  Patient did take occasional doses of lorazepam 0.5 mg for her anxiety.  Limited prescription of 10 tablets given at discharge.    Discharge Medications with Med changes:     Current Discharge Medication List        START taking these medications    Details   acetaminophen (TYLENOL) 325 MG tablet Take 2 tablets (650 mg) by mouth every 4 hours as needed for mild pain    Associated Diagnoses: Acute pancreatitis without infection or necrosis, unspecified pancreatitis type      LORazepam (ATIVAN) 0.5 MG tablet Take 0.5 tablets (0.25 mg) by mouth every 8 hours as needed for anxiety  Qty: 10 tablet, Refills: 0    Associated Diagnoses: Anxiety      meclizine (ANTIVERT) 25 MG tablet Take 1 tablet (25 mg) by mouth every 6 hours  Qty: 40 tablet, Refills: 0    Associated Diagnoses: Benign  paroxysmal positional vertigo, unspecified laterality      oxyCODONE (ROXICODONE) 5 MG tablet Take 1 tablet (5 mg) by mouth every 6 hours as needed for moderate pain or severe pain (5 mgs for moderate pain, 10 mgs for severe pain)  Qty: 20 tablet, Refills: 0    Associated Diagnoses: Acute pancreatitis without infection or necrosis, unspecified pancreatitis type           CONTINUE these medications which have NOT CHANGED    Details   estradiol (VIVELLE-DOT) 0.05 MG/24HR bi-weekly patch Apply 1 patch topically twice a week Monday and Thursday      gabapentin (NEURONTIN) 100 MG capsule Take 100 mg by mouth At Bedtime               Rationale for medication changes:      Tylenol and oxycodone for pain control.  Lorazepam for anxiety.  Meclizine for vertigo.      Consults     GASTROENTEROLOGY IP CONSULT      Immunizations given this encounter     Most Recent Immunizations   Administered Date(s) Administered    COVID-19 Monovalent 18+ (Moderna) 12/27/2021    Hepatitis B (Peds <19Y) 08/06/1997    Historical DTP/aP 03/29/1987    Influenza (H1N1) 10/18/2017    Influenza Vaccine >6 months (Alfuria,Fluzone) 10/17/2017    MMR 08/30/1996    OPV, trivalent, live 03/29/1989    OPV, unspecified 03/29/1989    Pneumococcal 23 valent 10/17/2014    Poliovirus, inactivated (IPV) 03/29/1989    TD,PF 7+ (Tenivac) 08/30/1996    TDAP Vaccine (Boostrix) 02/26/2011    Td (Adult), Adsorbed 08/30/1996           Anticoagulation Information      Recent INR results: No results for input(s): INR in the last 168 hours.  Warfarin doses (if applicable) or name of other anticoagulant: N/A      SIGNIFICANT IMAGING FINDINGS     Results for orders placed or performed during the hospital encounter of 08/10/23   MR Brain w/o Contrast    Impression    IMPRESSION:  1.  Patient terminated the exam. Only a portion of the sequences were acquired.  2.  There is no restricted diffusion to indicate acute infarct.  3.  Normal ventricles. No mass effect.  4.  The  imaged brain is unremarkable.   Abd/pelvis CT no contrast - Stone Protocol    Impression    IMPRESSION:   1.  No acute findings or inflammatory changes in the abdomen or pelvis.    2.  Colonic diverticulosis. No evidence of acute diverticulitis.     MRA Brain ([place]) wo Contrast    Impression    CONCLUSION:  HEAD MRI:  1.  No acute intracranial finding. No evidence for recent ischemia, intracranial hemorrhage, or mass.    HEAD MRA:  1.  Negative MR angiogram of the brain. No evidence for aneurysm, proximal vessel occlusion, high-grade intracranial stenosis or high flow vascular lesion.    NECK MRA:  1.  No evidence for dissection or hemodynamically significant narrowing in the neck based on NASCET criteria.   MRA Neck ([place]) wo & w Contrast    Impression    CONCLUSION:  HEAD MRI:  1.  No acute intracranial finding. No evidence for recent ischemia, intracranial hemorrhage, or mass.    HEAD MRA:  1.  Negative MR angiogram of the brain. No evidence for aneurysm, proximal vessel occlusion, high-grade intracranial stenosis or high flow vascular lesion.    NECK MRA:  1.  No evidence for dissection or hemodynamically significant narrowing in the neck based on NASCET criteria.   US Abdomen Limited    Impression    IMPRESSION:  1.  Small hypoechoic lesion in the liver measuring up to 1.7 cm. MRI is recommended.       MR Brain w/o & w Contrast    Impression    CONCLUSION:  HEAD MRI:  1.  No acute intracranial finding. No evidence for recent ischemia, intracranial hemorrhage, or mass.    HEAD MRA:  1.  Negative MR angiogram of the brain. No evidence for aneurysm, proximal vessel occlusion, high-grade intracranial stenosis or high flow vascular lesion.    NECK MRA:  1.  No evidence for dissection or hemodynamically significant narrowing in the neck based on NASCET criteria.       SIGNIFICANT LABORATORY FINDINGS     Most Recent 3 CBC's:  Recent Labs   Lab Test 08/11/23  0546 08/10/23  1850 05/30/23  0726   WBC  11.8* 17.2* 14.1*   HGB 12.4 13.5 14.6   MCV 89 89 87    389 414     Most Recent 3 BMP's:  Recent Labs   Lab Test 08/11/23  0546 08/10/23  1945 05/30/23  0726    138 140   POTASSIUM 3.9 4.1 3.9   CHLORIDE 109* 108* 108*   CO2 24 22 19*   BUN 7* 10 14.9   CR 0.60 0.71 0.63   ANIONGAP 4* 8 13   NASIMA 8.3* 8.6 9.4   GLC 95 102 146*     Most Recent 2 LFT's:  Recent Labs   Lab Test 08/11/23 0546 08/10/23  1945   * 131*   * 66*   ALKPHOS 90 91   BILITOTAL 0.5 0.3     Most Recent 3 INR's:No lab results found.  Most Recent TSH and T4:No lab results found.  Most Recent Hemoglobin A1c:No lab results found.  Most Recent 6 glucoses:  Recent Labs   Lab Test 08/11/23  0546 08/10/23  1945 05/30/23  0726 08/10/22  1943   GLC 95 102 146* 89     Most Recent Urinalysis:  Recent Labs   Lab Test 08/10/22  2219 03/18/22  1142   COLOR Light Yellow Yellow   APPEARANCE Turbid* Clear   URINEGLC Negative Negative   URINEBILI Negative Negative   URINEKETONE Negative Negative   SG 1.036* >=1.030   UBLD Negative Trace*   URINEPH 8.0* 5.5   PROTEIN Negative Negative   UROBILINOGEN  --  0.2   NITRITE Negative Negative   LEUKEST Negative Negative   RBCU 1 0-2   WBCU 1 0-5        7-Day Micro Results       No results found for the last 168 hours.              Discharge Orders        Physical Therapy Referral      Reason for your hospital stay    Pancreatitis and vertigo     Follow-up and recommended labs and tests     1.  Follow-up with primary care provider within 1 week.  2.  If continues to have vertigo issues could follow-up with vestibular therapy.  3.  Review historical records with primary care provider to determine if identified liver lesion 1.7 cm correlates with her previous hemangioma.  Goal is to achieve 2 years of stability.     Activity    Your activity upon discharge: activity as tolerated and off work for the next week     When to contact your care team    Call your primary doctor if you have any of the  following: increased swelling, increased pain, or worsening dizziness.     Diet    Follow this diet upon discharge: Orders Placed This Encounter      Low Fat Diet.  Stay well-hydrated.  Absolute alcohol avoidance.       Examination   Physical Exam   Temp:  [97.9  F (36.6  C)-98.7  F (37.1  C)] 98.5  F (36.9  C)  Pulse:  [57-65] 63  Resp:  [18-20] 18  BP: (105-120)/(63-73) 105/66  SpO2:  [96 %-98 %] 96 %  Wt Readings from Last 4 Encounters:   08/12/23 83.6 kg (184 lb 6.4 oz)   05/30/23 79.4 kg (175 lb)   08/10/22 74.8 kg (165 lb)   03/18/22 77.6 kg (171 lb)       Constitutional: awake, alert, cooperative, no apparent distress, and appears stated age  Eyes: Lids and lashes normal, pupils equal, round and reactive to light, extra ocular muscles intact, sclera clear, conjunctiva normal  ENT: Normocephalic, without obvious abnormality, atraumatic, sinuses nontender on palpation, external ears without lesions, oral pharynx with moist mucous membranes, tonsils without erythema or exudates, gums normal and good dentition.  Respiratory: No increased work of breathing, good air exchange, clear to auscultation bilaterally, no crackles or wheezing  Cardiovascular: Normal apical impulse, regular rate and rhythm, normal S1 and S2, no S3 or S4, and no murmur noted  GI: No scars, normal bowel sounds, soft, non-distended, very mild epigastric tenderness to palpation no masses palpated, no hepatosplenomegally  Skin: normal skin color, texture, turgor, no redness, warmth, or swelling, and no rashes  Musculoskeletal: There is no redness, warmth, or swelling of the joints.  Full range of motion noted.  Motor strength is 5 out of 5 all extremities bilaterally.  Tone is normal. no lower extremity pitting edema present  Neurologic: Cranial nerves II-XII are grossly intact. Sensory:  Sensory intact  Neuropsychiatric: General: normal, calm, and normal eye contact Level of consciousness: alert / normal Affect: normal Orientation: oriented to  self, place, time and situation Memory and insight: normal, memory for past and recent events intact, and thought process normal      Please see EMR for more detailed significant labs, imaging, consultant notes etc.    I, Marcus Franco MD, personally saw the patient today and spent greater than 30 minutes discharging this patient.    Marcus Franco MD  [facility]    CC:Debbi Burgos

## 2023-08-12 NOTE — PLAN OF CARE
Problem: Plan of Care - These are the overarching goals to be used throughout the patient stay.    Goal: Optimal Comfort and Wellbeing  Intervention: Monitor Pain and Promote Comfort  Recent Flowsheet Documentation  Taken 8/12/2023 0017 by Rocky Jauregui, RN  Pain Management Interventions: medication (see MAR)  Taken 8/11/2023 1940 by Rocky Jauregui, RN  Pain Management Interventions: declines   Goal Outcome Evaluation:             VSS. IVF infusing. Pain managed with prn medication x1 overnight. Abdomen tender with palpation, soft, and bowel sounds active. Tolerating clear liquids. Denies nausea. Patient reports dizziness with position changes and ambulating; continues to be steady on feet. Will continue to monitor.

## 2023-08-12 NOTE — PLAN OF CARE
Goal Outcome Evaluation:       Discharge Nursing Note    Patient discharged to home.  Reviewed AVS and answered all questions.  Belongings gathered in room, pt assisted down to car in wheelchair with assistance from nursing staff.

## 2023-08-12 NOTE — PROGRESS NOTES
[facility] Progress Note    Subjective: feels better, tolerating clears, wants to advance diet     Objective:  /66 (BP Location: Right arm)   Pulse 63   Temp 98.5  F (36.9  C) (Oral)   Resp 18   Ht 1.524 m (5')   Wt 83.6 kg (184 lb 6.4 oz)   SpO2 96%   BMI 36.01 kg/m     Gen: Awake, no acute distress  Gastrointestinal: soft, nontender, nondistended      Patient Active Problem List   Diagnosis    Acute diverticulitis    Lichen sclerosus et atrophicus of the vulva    Herpes simplex vulvovaginitis    CAMILO (generalized anxiety disorder)    Synovitis of left ankle    Tibialis posterior tendinitis, left    Acquired pes planovalgus, left    Dizziness    Acute pancreatitis without infection or necrosis, unspecified pancreatitis type    Anxiety    Endometriosis    Hepatic hemangioma    BPPV (benign paroxysmal positional vertigo)       Labs:  Recent Labs   Lab 08/11/23  0546 08/10/23  1850   WBC 11.8* 17.2*   RBC 4.20 4.49   HGB 12.4 13.5   HCT 37.3 40.0   MCV 89 89   MCH 29.5 30.1   MCHC 33.2 33.8   RDW 13.1 13.0    389      Recent Labs   Lab 08/11/23  0546 08/10/23  1945    138   CO2 24 22   BUN 7* 10     Recent Labs   Lab 08/11/23 0546 08/10/23  1945   ALKPHOS 90 91   * 131*   * 66*     No results found for: INR, TROPONIN, TROPI, TROPONIN, TROPR, TROPN    Image(s):  EXAM: US ABDOMEN LIMITED  LOCATION: [facility]  DATE: 8/11/2023     INDICATION: Eval RUQ  elevated lipase and pain  COMPARISON: 08/10/2023  TECHNIQUE: Limited abdominal ultrasound.     FINDINGS:     GALLBLADDER: Surgically removed.     BILE DUCTS: No biliary dilatation. The common duct measures 6 mm.     LIVER: Normal parenchyma with smooth contour. Small hypoechoic lesion measuring 1.7 x 1.7 x 1.4 cm.     RIGHT KIDNEY: No hydronephrosis. Small 1.2 cm cyst.     PANCREAS: The visualized portions are normal.     No ascites.                                                                       IMPRESSION:  1.  Small hypoechoic lesion in the liver measuring up to 1.7 cm. MRI is recommended.       Assessment:   Acute pancreatitis - s/p cholecystectomy, could be 2/2 etoh vs sludge/stone though no ductal dilation on ultrasound, lfts are increased from b/l normal; repeat labs today pending  Liver lesion - in vs outpatient MRI for follow up      Plan:   Advance diet as tolerated to low fat  In vs outpatient MRI for follow up liver lesion  Trend lfts  Alcohol cessation  5  OK for discharge from GI standpoint if tolerating PO    20 minutes of total time was spent providing patient care, including patient evaluation, reviewing documentation/tests, and .    Fe Rangel MD  8/12/2023 10:24 AM  [facility]

## 2023-08-14 ENCOUNTER — HOSPITAL ENCOUNTER (OUTPATIENT)
Dept: MAMMOGRAPHY | Facility: CLINIC | Age: 38
Discharge: HOME | End: 2023-08-14
Attending: OBSTETRICS & GYNECOLOGY
Payer: COMMERCIAL

## 2023-08-14 ENCOUNTER — HOSPITAL ENCOUNTER (OUTPATIENT)
Dept: MAMMOGRAPHY | Facility: CLINIC | Age: 38
Discharge: HOME | End: 2023-08-14
Attending: PHYSICIAN ASSISTANT
Payer: COMMERCIAL

## 2023-08-14 ENCOUNTER — PATIENT OUTREACH (OUTPATIENT)
Dept: CARE COORDINATION | Facility: CLINIC | Age: 38
End: 2023-08-14
Payer: COMMERCIAL

## 2023-08-14 DIAGNOSIS — N63.20 LEFT BREAST LUMP: ICD-10-CM

## 2023-08-14 PROCEDURE — 999N000065 MA POST PROCEDURE LEFT

## 2023-08-14 PROCEDURE — 88305 TISSUE EXAM BY PATHOLOGIST: CPT | Mod: TC | Performed by: OBSTETRICS & GYNECOLOGY

## 2023-08-14 PROCEDURE — 19083 BX BREAST 1ST LESION US IMAG: CPT | Mod: LT

## 2023-08-14 PROCEDURE — 250N000009 HC RX 250: Performed by: PHYSICIAN ASSISTANT

## 2023-08-14 RX ADMIN — LIDOCAINE HYDROCHLORIDE 10 ML: 10 SOLUTION INTRAVENOUS at 13:21

## 2023-08-14 NOTE — PROGRESS NOTES
Dayana arrived at [facility], accompanied by her mom, Sandra. I escorted pt to the [facility] consult room. Pt was identified using full name and . Wristband is on pt wrist. Pt was able to state which procedure and correct side breast biopsy was occurring today. I reviewed the consent form with the pt and pt was given the consent form to review before signing.     I reviewed post breast biopsy care. Pt acknowledged understanding of post biopsy care. Pt was given written post breast biopsy care handout to take home.    I explained results are expected in 3-5 business days and [facility] RN will call pt at number pt provided today. Pt has active MyChart, therefore, I explained pathology result alert may occur and reach pt before RN can call to discuss results, and it is up to pt to decide to view results or wait for RN call. Pt verbalizes understanding.    Pt had no questions or concerns.     Pt did state that she was in hospital for pancreatitis after she left [facility] on 8/10/23 and was released 23, and is feeling improved.      I escorted pt to the changing room and pt changed into gown. Pt placed personal belongings in a locker and pt has the boo. I escorted pt to US room and pt sat on chair. I offered pt the aroma therapy of Calming Blend oil and a warm blanket, and pt accepted both. I notified haystagg, Jon Mccullough, pt was ready and waiting in US room.     Tracy Johnson, RN, BSN, CBCN  [facility]

## 2023-08-14 NOTE — PROGRESS NOTES
"Clinic Care Coordination Contact  [facility]: Post-Discharge Note  SITUATION                                                      Admission:    Admission Date: 08/10/23   Reason for Admission: Abomdinal pain, Vertigo  Discharge:   Discharge Date: 08/12/23  Discharge Diagnosis: Acute pancreatitis without infection or necrosis, unspecified pancreatitis type, Dizziness, Anxiety, Hepatic hemangioma, BPPV (benign paroxysmal positional vertigo), Left breast lump    BACKGROUND                                                      Per hospital discharge summary and inpatient provider notes:    Dayana Mcclain is a 37 year old female with a pertinent history of CAMILO, endometriosis, chronic back pain, diverticulitis, who presents to this ED by private car for evaluation of dizziness.     Patient reports an onset of a recurrent headache but with dizziness, which occured on 8/7. She notes her symptoms have been constant and worsens with movement. She feels as if she's \"drunk when walking\". Patient doesn't have a medical history of a stroke, but does have a surgical history of a hysterectomy and cholecystectomy. She is allergie to vancomycin.     She does not identify any waxing or waning symptoms otherwise, exacerbating or alleviating features, associated symptoms except as mentioned. She denies any pain related complaints.    ASSESSMENT      Discharge Assessment  How are you doing now that you are home?: \"I'm fine I mean things are good. There's some pain some days and not everyday.\"  How are your symptoms? (Red Flag symptoms escalate to triage hotline per guidelines): Improved  Do you feel your condition is stable enough to be safe at home until your provider visit?: Yes  Does the patient have their discharge instructions? : Yes  Does the patient have questions regarding their discharge instructions? : No  Were you started on any new medications or were there changes to any of your previous medications? : Yes  Does the " patient have all of their medications?: Yes  Do you have questions regarding any of your medications? : No  Do you have all of your needed medical supplies or equipment (DME)?  (i.e. oxygen tank, CPAP, cane, etc.): Yes  Discharge follow-up appointment scheduled within 14 calendar days? : Yes  Discharge Follow Up Appointment Date: 08/15/23  Discharge Follow Up Appointment Scheduled with?: Primary Care Provider    Post-op ([facility] Only)  If the patient had a surgery or procedure, do they have any questions for a nurse?: No    PLAN                                                      Outpatient Plan:      RECOMMENDATIONS TO OUTPATIENT PROVIDER FOR F/U VISIT   Follow-up Appointments     Follow-up and recommended labs and tests       1.  Follow-up with primary care provider within 1 week.  2.  If continues to have vertigo issues could follow-up with vestibular   therapy.  3.  Review historical records with primary care provider to determine if   identified liver lesion 1.7 cm correlates with her previous hemangioma.    Goal is to achieve 2 years of stability.         No future appointments.      For any urgent concerns, please contact our 24 hour nurse triage line: [phone] ([phone])         SHOBHA Jones  [phone]  [facility]

## 2023-08-16 LAB
PATH REPORT.COMMENTS IMP SPEC: NORMAL
PATH REPORT.FINAL DX SPEC: NORMAL
PATH REPORT.GROSS SPEC: NORMAL
PATH REPORT.MICROSCOPIC SPEC OTHER STN: NORMAL
PATH REPORT.RELEVANT HX SPEC: NORMAL
PHOTO IMAGE: NORMAL

## 2023-08-16 PROCEDURE — 88305 TISSUE EXAM BY PATHOLOGIST: CPT | Mod: 26 | Performed by: PATHOLOGY

## 2023-08-16 PROCEDURE — 88342 IMHCHEM/IMCYTCHM 1ST ANTB: CPT | Mod: 26 | Performed by: PATHOLOGY

## 2023-08-16 PROCEDURE — 88341 IMHCHEM/IMCYTCHM EA ADD ANTB: CPT | Mod: 26 | Performed by: PATHOLOGY

## 2023-08-17 ENCOUNTER — TELEPHONE (OUTPATIENT)
Dept: MAMMOGRAPHY | Facility: CLINIC | Age: 38
End: 2023-08-17
Payer: COMMERCIAL

## 2023-08-17 NOTE — TELEPHONE ENCOUNTER
Following Radiologist, Dr Ashly Salas's review of 23, left breast biopsy pathology and imaging, I telephoned patient, confirming pt identity using name and , to review the benign and concordant pathology results and Radiologist's recommendation to return in 12mos for follow up breast ultrasound.    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  CK5 and estrogen receptor demonstrate an unremarkable mosaic immunophenotype.  Calponin and p63 highlight unremarkable myoepithelial cells.     Dr. Job Villanueva has reviewed this case and concurs with the diagnosis.     Red ink is confirmed. Total formalin fixation time: 8 hours and 45 minutes.          Pt states she is planning to discuss removing this with a surgeon since it it painful. Pt states she will be contacting the surgeon her mother used. I instructed pt to discuss and follow the surgeon's follow-up plan if surgery occurs, as pt may or may not need recommended breast ultrasound in 12 mos.     Pt verbalizes understanding of information provided during this call and acceptance of plan. I welcomed calls if any questions or concerns.     Pt states she is healing well from the breast biopsy, no problems or concerns.     I left courtesy message with Triage nurse, Missy, for Dr Jin Rodriges, regarding the above.    Tracy Johnson RN, BSN, [facility]

## 2023-08-24 ENCOUNTER — TELEPHONE (OUTPATIENT)
Dept: SURGERY | Facility: CLINIC | Age: 38
End: 2023-08-24
Payer: COMMERCIAL

## 2023-08-24 NOTE — TELEPHONE ENCOUNTER
Patient called, had a work up including biopsy for a breast lump done at  [facility].  Told it was benign and recommended one year follow up ultrasound, but patient wishes to see Dr. Joaquin to discuss removal.  Made her an appointment to see Dr. Joaquin at the [facility] on 9-21-23.

## 2023-09-20 NOTE — PROGRESS NOTES
Chief Complaint:  Left Breast Mass or Lesion    HPI:  This is a 37 year old woman who I'm asked to see by Jin Rodriges for evaluation of a left breast mass.  This was picked up by the patient.   She underwent a mammogram and ultrasound.  A needle biopsy shows a myxoid fibroadenoma.  The patient also complains of nipple discharge.  She describes it as somewhat milky.  It usually only comes out if she is looking for it but sometimes can be spontaneous.  She has no significant family history of breast cancer.      Past Medical History:  No past medical history on file.  Past Surgical History:   Procedure Laterality Date     SECTION      IR CYSTOGRAM  4/3/2018    KIDNEY STONE SURGERY      LAPAROSCOPIC CYSTECTOMY OVARIAN (ONCOLOGY)         Meds:    Current Outpatient Medications:     acetaminophen (TYLENOL) 325 MG tablet, Take 2 tablets (650 mg) by mouth every 4 hours as needed for mild pain, Disp: , Rfl:     estradiol (VIVELLE-DOT) 0.05 MG/24HR bi-weekly patch, Apply 1 patch topically twice a week Monday and Thursday, Disp: , Rfl:     gabapentin (NEURONTIN) 100 MG capsule, Take 100 mg by mouth At Bedtime, Disp: , Rfl:     LORazepam (ATIVAN) 0.5 MG tablet, Take 0.5 tablets (0.25 mg) by mouth every 8 hours as needed for anxiety, Disp: 10 tablet, Rfl: 0    meclizine (ANTIVERT) 25 MG tablet, Take 1 tablet (25 mg) by mouth every 6 hours, Disp: 40 tablet, Rfl: 0    oxyCODONE (ROXICODONE) 5 MG tablet, Take 1 tablet (5 mg) by mouth every 6 hours as needed for moderate pain or severe pain (5 mgs for moderate pain, 10 mgs for severe pain), Disp: 20 tablet, Rfl: 0    Allergies:  Allergies   Allergen Reactions    Vancomycin Itching     Complained of itching hands/arms after infusion completed.    Hydrocodone Hives and Nausea    Hydromorphone Itching    Amoxicillin Rash    Erythromycin Nausea and Vomiting and Rash    Vicodin [Hydrocodone-Acetaminophen] Rash       Social History:   reports that she has  "quit smoking. Her smoking use included cigarettes. She has a 1.00 pack-year smoking history. She has never used smokeless tobacco. She reports that she does not drink alcohol and does not use drugs.    ROS:  Pertinent items are noted in HPI.    Physical exam:  Resp 16   Ht 1.549 m (5' 1\")   Wt 81.6 kg (180 lb)   BMI 34.01 kg/m      General: alert, appears stated age, and cooperative  Lungs: clear to auscultation bilaterally  CV: regular rate and rhythm  Breast: Clearly palpable mass just along the superior edge of the areola at about the 1 o'clock position.  Measures about 1.5 cm in diameter.  No other palpable masses.  With fairly vigorous squeezing unable to get some milky discharge from the left nipple.  Nothing that is bloody or clear.  Comes from multiple ducts.  Lymph Nodes: non-palpable  Neuro: Grossly normal      Studies: Mammograms, ultrasound are reviewed.    Impression: Left breast mass. Discussed option of excision. Risks and benefits of surgery explained and they wish to proceed. All questions answered.  Reassured her that the discharge she has her nipples appears to be physiologic to me.  I do not think we need to add a subareolar duct excision.    Plan: Excisional biopsy.  Typically an outpatient procedure under local MAC anesthetic.       "

## 2023-09-21 ENCOUNTER — OFFICE VISIT (OUTPATIENT)
Dept: SURGERY | Facility: CLINIC | Age: 38
End: 2023-09-21
Attending: OBSTETRICS & GYNECOLOGY
Payer: COMMERCIAL

## 2023-09-21 VITALS — WEIGHT: 180 LBS | RESPIRATION RATE: 16 BRPM | BODY MASS INDEX: 33.99 KG/M2 | HEIGHT: 61 IN

## 2023-09-21 DIAGNOSIS — N63.42 SUBAREOLAR MASS OF LEFT BREAST: Primary | ICD-10-CM

## 2023-09-21 PROCEDURE — 99203 OFFICE O/P NEW LOW 30 MIN: CPT | Performed by: SPECIALIST

## 2023-09-21 PROCEDURE — G0463 HOSPITAL OUTPT CLINIC VISIT: HCPCS | Performed by: SPECIALIST

## 2023-09-21 RX ORDER — HYDROXYZINE HYDROCHLORIDE 25 MG/1
TABLET, FILM COATED ORAL
COMMUNITY
Start: 2022-10-14 | End: 2023-10-04

## 2023-09-21 NOTE — NURSING NOTE
Dayana presents to [facility] for a surgical consult with Dr. Joaquin  regarding a left breast lesion, she did have imaging and biopsy, see reports for details.  RN assessment and EMR update.  Patient met with Dr. Joaquin .  See dictation for details of visit. She will plan  surgical excision of lesion.  Pre and post op teaching, written and verbal, provided to patient.  Walked her to Linda/Richie for surgery scheduling.  Follow up pending biopsy results.

## 2023-09-21 NOTE — LETTER
2023         RE: Dayana Mcclain  [address] [address]        Dear Colleague,    Thank you for referring your patient, Dayana Mcclain, to the [facility] [place]. Please see a copy of my visit note below.    Chief Complaint:  Left Breast Mass or Lesion    HPI:  This is a 37 year old woman who I'm asked to see by Jin Rodriges for evaluation of a left breast mass.  This was picked up by the patient.   She underwent a mammogram and ultrasound.  A needle biopsy shows a myxoid fibroadenoma.  The patient also complains of nipple discharge.  She describes it as somewhat milky.  It usually only comes out if she is looking for it but sometimes can be spontaneous.  She has no significant family history of breast cancer.      Past Medical History:  No past medical history on file.  Past Surgical History:   Procedure Laterality Date      SECTION       IR CYSTOGRAM  4/3/2018     KIDNEY STONE SURGERY       LAPAROSCOPIC CYSTECTOMY OVARIAN (ONCOLOGY)         Meds:    Current Outpatient Medications:      acetaminophen (TYLENOL) 325 MG tablet, Take 2 tablets (650 mg) by mouth every 4 hours as needed for mild pain, Disp: , Rfl:      estradiol (VIVELLE-DOT) 0.05 MG/24HR bi-weekly patch, Apply 1 patch topically twice a week Monday and Thursday, Disp: , Rfl:      gabapentin (NEURONTIN) 100 MG capsule, Take 100 mg by mouth At Bedtime, Disp: , Rfl:      LORazepam (ATIVAN) 0.5 MG tablet, Take 0.5 tablets (0.25 mg) by mouth every 8 hours as needed for anxiety, Disp: 10 tablet, Rfl: 0     meclizine (ANTIVERT) 25 MG tablet, Take 1 tablet (25 mg) by mouth every 6 hours, Disp: 40 tablet, Rfl: 0     oxyCODONE (ROXICODONE) 5 MG tablet, Take 1 tablet (5 mg) by mouth every 6 hours as needed for moderate pain or severe pain (5 mgs for moderate pain, 10 mgs for severe pain), Disp: 20 tablet, Rfl: 0    Allergies:  Allergies   Allergen Reactions     Vancomycin  "Itching     Complained of itching hands/arms after infusion completed.     Hydrocodone Hives and Nausea     Hydromorphone Itching     Amoxicillin Rash     Erythromycin Nausea and Vomiting and Rash     Vicodin [Hydrocodone-Acetaminophen] Rash       Social History:   reports that she has quit smoking. Her smoking use included cigarettes. She has a 1.00 pack-year smoking history. She has never used smokeless tobacco. She reports that she does not drink alcohol and does not use drugs.    ROS:  Pertinent items are noted in HPI.    Physical exam:  Resp 16   Ht 1.549 m (5' 1\")   Wt 81.6 kg (180 lb)   BMI 34.01 kg/m      General: alert, appears stated age, and cooperative  Lungs: clear to auscultation bilaterally  CV: regular rate and rhythm  Breast: Clearly palpable mass just along the superior edge of the areola at about the 1 o'clock position.  Measures about 1.5 cm in diameter.  No other palpable masses.  With fairly vigorous squeezing unable to get some milky discharge from the left nipple.  Nothing that is bloody or clear.  Comes from multiple ducts.  Lymph Nodes: non-palpable  Neuro: Grossly normal      Studies: Mammograms, ultrasound are reviewed.    Impression: Left breast mass. Discussed option of excision. Risks and benefits of surgery explained and they wish to proceed. All questions answered.  Reassured her that the discharge she has her nipples appears to be physiologic to me.  I do not think we need to add a subareolar duct excision.    Plan: Excisional biopsy.  Typically an outpatient procedure under local MAC anesthetic.           Again, thank you for allowing me to participate in the care of your patient.        Sincerely,        Kiki Joaquin MD  "

## 2023-09-23 ENCOUNTER — HEALTH MAINTENANCE LETTER (OUTPATIENT)
Age: 38
End: 2023-09-23

## 2023-10-06 ENCOUNTER — ANESTHESIA EVENT (OUTPATIENT)
Dept: SURGERY | Facility: AMBULATORY SURGERY CENTER | Age: 38
End: 2023-10-06
Payer: COMMERCIAL

## 2023-10-09 ENCOUNTER — HOSPITAL ENCOUNTER (OUTPATIENT)
Facility: AMBULATORY SURGERY CENTER | Age: 38
Discharge: HOME | End: 2023-10-09
Attending: SPECIALIST
Payer: COMMERCIAL

## 2023-10-09 ENCOUNTER — ANESTHESIA (OUTPATIENT)
Dept: SURGERY | Facility: AMBULATORY SURGERY CENTER | Age: 38
End: 2023-10-09
Payer: COMMERCIAL

## 2023-10-09 VITALS
OXYGEN SATURATION: 95 % | WEIGHT: 180 LBS | SYSTOLIC BLOOD PRESSURE: 94 MMHG | TEMPERATURE: 97 F | RESPIRATION RATE: 16 BRPM | BODY MASS INDEX: 33.99 KG/M2 | HEART RATE: 68 BPM | DIASTOLIC BLOOD PRESSURE: 60 MMHG | HEIGHT: 61 IN

## 2023-10-09 DIAGNOSIS — N63.42 SUBAREOLAR MASS OF LEFT BREAST: ICD-10-CM

## 2023-10-09 PROCEDURE — 19120 REMOVAL OF BREAST LESION: CPT | Mod: LT | Performed by: SPECIALIST

## 2023-10-09 RX ORDER — ONDANSETRON 4 MG/1
4 TABLET, ORALLY DISINTEGRATING ORAL EVERY 30 MIN PRN
Status: DISCONTINUED | OUTPATIENT
Start: 2023-10-09 | End: 2023-10-10 | Stop reason: HOSPADM

## 2023-10-09 RX ORDER — IBUPROFEN 600 MG/1
600 TABLET, FILM COATED ORAL
Status: COMPLETED | OUTPATIENT
Start: 2023-10-09 | End: 2023-10-09

## 2023-10-09 RX ORDER — LIDOCAINE 40 MG/G
CREAM TOPICAL
Status: DISCONTINUED | OUTPATIENT
Start: 2023-10-09 | End: 2023-10-10 | Stop reason: HOSPADM

## 2023-10-09 RX ORDER — OXYCODONE HYDROCHLORIDE 10 MG/1
10 TABLET ORAL
Status: DISCONTINUED | OUTPATIENT
Start: 2023-10-09 | End: 2023-10-10 | Stop reason: HOSPADM

## 2023-10-09 RX ORDER — SODIUM CHLORIDE, SODIUM LACTATE, POTASSIUM CHLORIDE, CALCIUM CHLORIDE 600; 310; 30; 20 MG/100ML; MG/100ML; MG/100ML; MG/100ML
INJECTION, SOLUTION INTRAVENOUS CONTINUOUS
Status: DISCONTINUED | OUTPATIENT
Start: 2023-10-09 | End: 2023-10-10 | Stop reason: HOSPADM

## 2023-10-09 RX ORDER — HYDROMORPHONE HCL IN WATER/PF 6 MG/30 ML
0.4 PATIENT CONTROLLED ANALGESIA SYRINGE INTRAVENOUS EVERY 5 MIN PRN
Status: DISCONTINUED | OUTPATIENT
Start: 2023-10-09 | End: 2023-10-10 | Stop reason: HOSPADM

## 2023-10-09 RX ORDER — PROPOFOL 10 MG/ML
INJECTION, EMULSION INTRAVENOUS CONTINUOUS PRN
Status: DISCONTINUED | OUTPATIENT
Start: 2023-10-09 | End: 2023-10-09

## 2023-10-09 RX ORDER — ONDANSETRON 2 MG/ML
INJECTION INTRAMUSCULAR; INTRAVENOUS PRN
Status: DISCONTINUED | OUTPATIENT
Start: 2023-10-09 | End: 2023-10-09

## 2023-10-09 RX ORDER — HYDROCODONE BITARTRATE AND ACETAMINOPHEN 5; 325 MG/1; MG/1
1 TABLET ORAL
Status: DISCONTINUED | OUTPATIENT
Start: 2023-10-09 | End: 2023-10-10 | Stop reason: HOSPADM

## 2023-10-09 RX ORDER — LIDOCAINE HYDROCHLORIDE 20 MG/ML
INJECTION, SOLUTION INFILTRATION; PERINEURAL PRN
Status: DISCONTINUED | OUTPATIENT
Start: 2023-10-09 | End: 2023-10-09

## 2023-10-09 RX ORDER — ONDANSETRON 2 MG/ML
4 INJECTION INTRAMUSCULAR; INTRAVENOUS EVERY 30 MIN PRN
Status: DISCONTINUED | OUTPATIENT
Start: 2023-10-09 | End: 2023-10-10 | Stop reason: HOSPADM

## 2023-10-09 RX ORDER — FENTANYL CITRATE 50 UG/ML
INJECTION, SOLUTION INTRAMUSCULAR; INTRAVENOUS PRN
Status: DISCONTINUED | OUTPATIENT
Start: 2023-10-09 | End: 2023-10-09

## 2023-10-09 RX ORDER — OXYCODONE HYDROCHLORIDE 5 MG/1
5 TABLET ORAL
Status: COMPLETED | OUTPATIENT
Start: 2023-10-09 | End: 2023-10-09

## 2023-10-09 RX ORDER — BUPIVACAINE HYDROCHLORIDE 2.5 MG/ML
INJECTION, SOLUTION INFILTRATION; PERINEURAL PRN
Status: DISCONTINUED | OUTPATIENT
Start: 2023-10-09 | End: 2023-10-09 | Stop reason: HOSPADM

## 2023-10-09 RX ORDER — GLYCOPYRROLATE 0.2 MG/ML
INJECTION, SOLUTION INTRAMUSCULAR; INTRAVENOUS PRN
Status: DISCONTINUED | OUTPATIENT
Start: 2023-10-09 | End: 2023-10-09

## 2023-10-09 RX ORDER — FENTANYL CITRATE 0.05 MG/ML
25 INJECTION, SOLUTION INTRAMUSCULAR; INTRAVENOUS EVERY 5 MIN PRN
Status: DISCONTINUED | OUTPATIENT
Start: 2023-10-09 | End: 2023-10-10 | Stop reason: HOSPADM

## 2023-10-09 RX ORDER — ACETAMINOPHEN 325 MG/1
975 TABLET ORAL ONCE
Status: COMPLETED | OUTPATIENT
Start: 2023-10-09 | End: 2023-10-09

## 2023-10-09 RX ORDER — FENTANYL CITRATE 0.05 MG/ML
25 INJECTION, SOLUTION INTRAMUSCULAR; INTRAVENOUS
Status: DISCONTINUED | OUTPATIENT
Start: 2023-10-09 | End: 2023-10-10 | Stop reason: HOSPADM

## 2023-10-09 RX ORDER — DEXAMETHASONE SODIUM PHOSPHATE 4 MG/ML
INJECTION, SOLUTION INTRA-ARTICULAR; INTRALESIONAL; INTRAMUSCULAR; INTRAVENOUS; SOFT TISSUE PRN
Status: DISCONTINUED | OUTPATIENT
Start: 2023-10-09 | End: 2023-10-09

## 2023-10-09 RX ORDER — HYDROMORPHONE HCL IN WATER/PF 6 MG/30 ML
0.2 PATIENT CONTROLLED ANALGESIA SYRINGE INTRAVENOUS EVERY 5 MIN PRN
Status: DISCONTINUED | OUTPATIENT
Start: 2023-10-09 | End: 2023-10-10 | Stop reason: HOSPADM

## 2023-10-09 RX ORDER — FENTANYL CITRATE 0.05 MG/ML
50 INJECTION, SOLUTION INTRAMUSCULAR; INTRAVENOUS EVERY 5 MIN PRN
Status: DISCONTINUED | OUTPATIENT
Start: 2023-10-09 | End: 2023-10-10 | Stop reason: HOSPADM

## 2023-10-09 RX ORDER — HYDROCODONE BITARTRATE AND ACETAMINOPHEN 5; 325 MG/1; MG/1
1 TABLET ORAL EVERY 6 HOURS PRN
Qty: 10 TABLET | Refills: 0 | Status: SHIPPED | OUTPATIENT
Start: 2023-10-09

## 2023-10-09 RX ADMIN — LIDOCAINE HYDROCHLORIDE 2 ML: 20 INJECTION, SOLUTION INFILTRATION; PERINEURAL at 08:31

## 2023-10-09 RX ADMIN — ACETAMINOPHEN 975 MG: 325 TABLET ORAL at 07:59

## 2023-10-09 RX ADMIN — PROPOFOL 120 MCG/KG/MIN: 10 INJECTION, EMULSION INTRAVENOUS at 08:31

## 2023-10-09 RX ADMIN — ONDANSETRON 4 MG: 2 INJECTION INTRAMUSCULAR; INTRAVENOUS at 08:35

## 2023-10-09 RX ADMIN — FENTANYL CITRATE 50 MCG: 50 INJECTION, SOLUTION INTRAMUSCULAR; INTRAVENOUS at 08:31

## 2023-10-09 RX ADMIN — SODIUM CHLORIDE, SODIUM LACTATE, POTASSIUM CHLORIDE, CALCIUM CHLORIDE: 600; 310; 30; 20 INJECTION, SOLUTION INTRAVENOUS at 08:21

## 2023-10-09 RX ADMIN — OXYCODONE HYDROCHLORIDE 5 MG: 5 TABLET ORAL at 09:14

## 2023-10-09 RX ADMIN — FENTANYL CITRATE 50 MCG: 50 INJECTION, SOLUTION INTRAMUSCULAR; INTRAVENOUS at 08:40

## 2023-10-09 RX ADMIN — GLYCOPYRROLATE 0.2 MG: 0.2 INJECTION, SOLUTION INTRAMUSCULAR; INTRAVENOUS at 08:30

## 2023-10-09 RX ADMIN — IBUPROFEN 600 MG: 600 TABLET, FILM COATED ORAL at 09:13

## 2023-10-09 RX ADMIN — DEXAMETHASONE SODIUM PHOSPHATE 4 MG: 4 INJECTION, SOLUTION INTRA-ARTICULAR; INTRALESIONAL; INTRAMUSCULAR; INTRAVENOUS; SOFT TISSUE at 08:35

## 2023-10-09 ASSESSMENT — LIFESTYLE VARIABLES: TOBACCO_USE: 1

## 2023-10-09 NOTE — ANESTHESIA PREPROCEDURE EVALUATION
Anesthesia Pre-Procedure Evaluation    Patient: Dayana Mcclain   MRN: [id number] : 1985        Procedure : Procedure(s):  Left Breast Biopsy          Past Medical History:   Diagnosis Date    Gastroesophageal reflux disease     Other chronic pain     Pancreatic disease     Uncomplicated asthma       Past Surgical History:   Procedure Laterality Date     SECTION      IR CYSTOGRAM  4/3/2018    KIDNEY STONE SURGERY      LAPAROSCOPIC CYSTECTOMY OVARIAN (ONCOLOGY)        Allergies   Allergen Reactions    Vancomycin Itching     Complained of itching hands/arms after infusion completed.    Amoxicillin Rash    Erythromycin Nausea and Vomiting and Rash      Social History     Tobacco Use    Smoking status: Every Day     Packs/day: 0.10     Years: 10.00     Pack years: 1.00     Types: Cigarettes    Smokeless tobacco: Never    Tobacco comments:     3 months ago quit   Substance Use Topics    Alcohol use: No      Wt Readings from Last 1 Encounters:   10/09/23 81.6 kg (180 lb)        Anesthesia Evaluation   Pt has had prior anesthetic.     No history of anesthetic complications       ROS/MED HX  ENT/Pulmonary:  - neg pulmonary ROS   (+)                tobacco use, Current use,    asthma                  Neurologic:  - neg neurologic ROS     Cardiovascular:  - neg cardiovascular ROS     METS/Exercise Tolerance: >4 METS    Hematologic:  - neg hematologic  ROS     Musculoskeletal:  - neg musculoskeletal ROS     GI/Hepatic: Comment: Pancreatitis - unclear etiology - neg GI/hepatic ROS   (+) GERD, Asymptomatic on medication,                  Renal/Genitourinary:  - neg Renal ROS   (+)       Nephrolithiasis ,       Endo:  - neg endo ROS   (+)               Obesity (bmi 34),       Psychiatric/Substance Use:  - neg psychiatric ROS   (+) psychiatric history anxiety       Infectious Disease:  - neg infectious disease ROS     Malignancy:  - neg malignancy ROS     Other:  - neg other ROS    (+)  , H/O Chronic  Pain,         Physical Exam    Airway        Mallampati: II   TM distance: > 3 FB   Neck ROM: full   Mouth opening: > 3 cm    Respiratory Devices and Support         Dental       (+) Minor Abnormalities - some fillings, tiny chips      Cardiovascular   cardiovascular exam normal          Pulmonary   pulmonary exam normal                OUTSIDE LABS:  CBC:   Lab Results   Component Value Date    WBC 11.8 (H) 08/11/2023    WBC 17.2 (H) 08/10/2023    HGB 12.4 08/11/2023    HGB 13.5 08/10/2023    HCT 37.3 08/11/2023    HCT 40.0 08/10/2023     08/11/2023     08/10/2023     BMP:   Lab Results   Component Value Date     08/11/2023     08/10/2023    POTASSIUM 3.9 08/11/2023    POTASSIUM 4.1 08/10/2023    CHLORIDE 109 (H) 08/11/2023    CHLORIDE 108 (H) 08/10/2023    CO2 24 08/11/2023    CO2 22 08/10/2023    BUN 7 (L) 08/11/2023    BUN 10 08/10/2023    CR 0.60 08/11/2023    CR 0.71 08/10/2023    GLC 95 08/11/2023     08/10/2023     COAGS: No results found for: PTT, INR, FIBR  POC: No results found for: BGM, HCG, HCGS  HEPATIC:   Lab Results   Component Value Date    ALBUMIN 3.1 (L) 08/11/2023    PROTTOTAL 5.7 (L) 08/11/2023     (H) 08/11/2023     (H) 08/11/2023    ALKPHOS 90 08/11/2023    BILITOTAL 0.5 08/11/2023     OTHER:   Lab Results   Component Value Date    NASIMA 8.3 (L) 08/11/2023    MAG 1.9 08/11/2023    LIPASE 85 (H) 08/11/2023    SED 8 03/18/2022       Anesthesia Plan    ASA Status:  2    NPO Status:  NPO Appropriate    Anesthesia Type: MAC.     - Reason for MAC: straight local not clinically adequate, immobility needed   Induction: Intravenous, Propofol.   Maintenance: TIVA.        Consents    Anesthesia Plan(s) and associated risks, benefits, and realistic alternatives discussed. Questions answered and patient/representative(s) expressed understanding.     - Discussed:     - Discussed with:  Patient            Postoperative Care    Pain management: Multi-modal  analgesia.   PONV prophylaxis: Ondansetron (or other 5HT-3), Dexamethasone or Solumedrol     Comments:    Other Comments: MAC - propofol gtt, versed/fentanyl/ketamine bolus PRN for pain  Decadron/Zofran for antiemesis  Convert to General with LMA if unable to maintain adequate SpO2 on reduced FiO2 (<30%)  Fire precautions  Reviewed anesthetic options and risks. Patient agrees to proceed.               Zach Buchanan MD

## 2023-10-09 NOTE — ANESTHESIA POSTPROCEDURE EVALUATION
Patient: Dayana Mcclain    Procedure: Procedure(s):  Left Breast Biopsy       Anesthesia Type:  MAC    Note:  Disposition: Outpatient   Postop Pain Control: Uneventful            Sign Out: Well controlled pain   PONV: No   Neuro/Psych: Uneventful            Sign Out: Acceptable/Baseline neuro status   Airway/Respiratory: Uneventful            Sign Out: Acceptable/Baseline resp. status   CV/Hemodynamics: Uneventful            Sign Out: Acceptable CV status; No obvious hypovolemia; No obvious fluid overload   Other NRE: NONE   DID A NON-ROUTINE EVENT OCCUR? No           Last vitals:  Vitals Value Taken Time   BP 94/60 10/09/23 0915   Temp 97  F (36.1  C) 10/09/23 0900   Pulse 61 10/09/23 0940   Resp 16 10/09/23 0915   SpO2 96 % 10/09/23 0940   Vitals shown include unvalidated device data.    Electronically Signed By: Zach Buchanan MD  October 9, 2023  10:49 AM

## 2023-10-09 NOTE — DISCHARGE INSTRUCTIONS
If you have any questions or concerns regarding your procedure, please contact Dr. Joaquin, her office number is [phone].    You have received 975 mg of acetaminophen (Tylenol) at 8am. Please do not take an additional dose of Tylenol until after 2pm.     Do not exceed 4,000 mg of acetaminophen during a 24 hour period and keep in mind that acetaminophen can also be found in many over-the-counter cold medications as well as narcotics that may be given for pain.      You received 600 mg of ibuprofen at 9:15 am. Do NOT take any Ibuprofen / Advil / Motrin / Aleve / Naproxen or products containing Ibuprofen until 3:15 PM or later.    You received 5 mg of Oxycodone at 9:15 am.    Discharge Instructions for Lumpectomy or Breast Biopsy     You just had a procedure to remove a lump or a small piece of tissue from your breast. After surgery, be sure to have an adult drive you home. Ask your healthcare provider when you will get the results of the biopsy. Will they call you or will you talk about it at your next visit? Make a follow-up appointment as directed by your healthcare provider.     What to expect    These are common after a lumpectomy or breast biopsy:    Bruising and mild swelling around the incision  Mild discomfort for a few days  Feeling tired for a day or so  Feeling anxious or down    Diet    Here's what to eat and drink after a lumpectomy or breast biopsy:    Start with liquids and light, easy-to-digest foods, such as bananas and dry toast. As you feel up to it, slowly return to your normal diet.  Drink at least  6 to 8 glasses of water or other nonalcoholic fluids a day, unless directed otherwise.    Activity    What you can do after a lumpectomy or breast biopsy:   After the procedure, take it easy for the rest of the day.  If you had general anesthesia, don't use machinery or power tools, drink alcohol, or make any major decisions for at least the first 24 hours.  Ask your healthcare providers how you  should care for the dressing and when you can take it off.  You may shower and pat the cut (incision) dry, but don't soak in a tub until you see the healthcare provider again.   Return to normal activities (including driving) after 24 hours.     Bandage and incision care    To care for your incision site:  Take pain medicines as directed. Don t wait until the pain gets bad before taking them. Don t drink alcohol while on pain medicines.  Place an ice pack over the bandaged incision for no longer than 20 minutes at a time. Do this as instructed by your healthcare provider. To make an ice pack, put ice cubes in a plastic bag that seals at the top. Wrap the bag in a clean, thin towel or cloth. Never put ice or an ice pack directly on your skin.  Wear a comfortable, snug-fitting bra at all times, even to bed. This helps keep swelling down.  If your incision has been closed with glue, don't apply lotion, ointment, or cream to the area. Doing so can cause the glue to dissolve.  If strips of tape have been used to close your incision, don't pull them off. Let them fall off on their own.  If you have a gauze bandage, keep it and the wound dry for 48 hours. If the gauze bandage gets wet, replace it with a clean, dry bandage.    When to call your healthcare provider    Call your healthcare provider right away if you have any of these:   Vomiting or nausea that does not go away  Fever of 101.5  F (38.6 C) or higher, or chills  Fluid leaking from the incision that smells bad  Pain not relieved by pain medicines  Bleeding, warmth, redness, or hard swelling around the incision  Chest pain or shortness of breath  Trouble urinating, blood in urine, pain when urinating, or urine that's cloudy or smells bad    Coping with pain    *Pain after surgery is normal and expected*    If you have pain after surgery, pain medicine will help you feel better. Take it as told, before pain becomes severe. Also, ask your healthcare provider or  pharmacist about other ways to control pain. This might be with heat, ice, or relaxation. And follow any other instructions your surgeon or nurse gives you.    Tips for taking pain medicine    To get the best relief possible, remember these points:    Pain medicines can upset your stomach. Taking them with a little food may help.  Most pain relievers taken by mouth need at least 20 to 30 minutes to start to work.  Don't wait till your pain becomes severe before you take your medicine. Try to time your medicine so that you can take it before starting an activity. This might be before you get dressed, go for a walk, or sit down for dinner.  Constipation is a common side effect of pain medicines. You can take medicines such as laxatives (Miralax) or stool softeners to help ease constipation. Drinking lots of fluids and eating foods such as fruits and vegetables that are high in fiber can also help.  Drinking alcohol and taking pain medicine can cause dizziness and slow your breathing. It can even be deadly. Don't drink alcohol while taking pain medicine.  Pain medicine can make you react more slowly to things. Don't drive or run machinery while taking pain medicine.  Your healthcare provider may tell you to take acetaminophen to help ease your pain. Ask him or her how much you are supposed to take each day. Acetaminophen or other pain relievers may interact with your prescription medicines or other over-the-counter (OTC) medicines. Some prescription medicines have acetaminophen and other ingredients. Using both prescription and OTC acetaminophen for pain can cause you to overdose. Read the labels on your OTC medicines with care. This will help you to clearly know the list of ingredients, how much to take, and any warnings. It may also help you not take too much acetaminophen. If you have questions or don't understand the information, ask your pharmacist or healthcare provider to explain it to you before you take the  OTC medicine.    Discharge Instructions: After Your Surgery, regarding Anesthesia    You ve just had surgery. During surgery, you were given medicine called anesthesia to keep you relaxed and free of pain. After surgery, you may have some pain or nausea. This is common. Here are some tips for feeling better and getting well after surgery.    Going home    Your healthcare provider will show you how to take care of yourself when you go home. He or she will also answer your questions. Have an adult family member or friend drive you home. For the first 24 hours after your surgery:    Don't drive or use heavy equipment.  Don't make important decisions or sign legal papers.  Don't drink alcohol.  Have an adult stay with you for the next 24 hours. He or she can watch for problems and help keep you safe.  Be sure to go to all follow-up visits with your healthcare provider. And rest after your surgery for as long as your healthcare provider tells you to.    Managing nausea    Some people have an upset stomach after surgery. This is often because of anesthesia, pain, or pain medicine, or the stress of surgery. These tips will help you handle nausea and eat healthy foods as you get better. If you were on a special food plan before surgery, ask your healthcare provider if you should follow it while you get better. These tips may help:    Don't push yourself to eat. Your body will tell you when to eat and how much.  Start off with clear liquids and soup. They are easier to digest.  Next try semi-solid foods, such as mashed potatoes, applesauce, and gelatin, as you feel ready.  Slowly move to solid foods. Don t eat fatty, rich, or spicy foods at first.  Don't force yourself to have 3 large meals a day. Instead eat smaller amounts more often.  Take pain medicines with a small amount of solid food, such as crackers or toast, to prevent nausea.    If you have obstructive sleep apnea    You were given anesthesia medicine during  surgery to keep you comfortable and free of pain. After surgery, you may have more apnea spells because of this medicine and other medicines you were given. The spells may last longer than usual.   At home:    Keep using the continuous positive airway pressure (CPAP) device when you sleep. Unless your healthcare provider tells you not to, use it when you sleep, day or night. CPAP is a common device used to treat obstructive sleep apnea.  Talk with your provider before taking any pain medicine, muscle relaxants, or sedatives. Your provider will tell you about the possible dangers of taking these medicines.

## 2023-10-09 NOTE — OP NOTE
Operative Note    Name:  Dayana Mcclain  PCP:  Jin Rodriges  Procedure Date:  10/9/2023       Procedure:  Procedure(s):  Left Breast Biopsy     Pre-Procedure Diagnosis:  Subareolar mass of left breast [N63.42]     Post-Procedure Diagnosis:    Same     Surgeon(s):  Kiki Joaquin MD     Assistant: None      Anesthesia Type:  MAC       Findings:  Moderate sized fibroadenoma.    Operative Report:    The patient was brought to the operating suite where she was placed in the supine position.  She was prepped and draped in a sterile fashion.  After infiltration with 1% lidocaine and quarter percent Marcaine  a curvilinear  incision was made over the mass in the upper outer  left breast .  This is carried down to and around the mass  with electrocautery. It was removed  and sent  to pathology. The wound was inspected for hemostasis and closed with 3-0 Vicryl to the subcutaneous tissues and a subcuticular suture for Monocryl to the skin.  Dressings were placed.  The patient procedure well.    Estimated Blood Loss:   5cc    Specimens:    ID Type Source Tests Collected by Time Destination   1 : Left Breast Mass Tissue Breast, Left SURGICAL PATHOLOGY EXAM Kiki Joaquin MD 10/9/2023  8:48 AM            Drains: None       Complications:    None    Kiki Joaquin MD     Date: 10/9/2023  Time: 8:58 AM

## 2023-10-09 NOTE — ANESTHESIA CARE TRANSFER NOTE
Patient: Dayana Mcclain    Procedure: Procedure(s):  Left Breast Biopsy       Diagnosis: Subareolar mass of left breast [N63.42]  Diagnosis Additional Information: No value filed.    Anesthesia Type:   MAC     Note:    Oropharynx: oropharynx clear of all foreign objects  Level of Consciousness: awake and drowsy  Oxygen Supplementation: room air    Independent Airway: airway patency satisfactory and stable  Dentition: dentition unchanged  Vital Signs Stable: post-procedure vital signs reviewed and stable  Report to RN Given: handoff report given  Patient transferred to: Phase II    Handoff Report: Identifed the Patient, Identified the Reponsible Provider, Reviewed the pertinent medical history, Discussed the surgical course, Reviewed Intra-OP anesthesia mangement and issues during anesthesia, Set expectations for post-procedure period and Allowed opportunity for questions and acknowledgement of understanding      Vitals:  Vitals Value Taken Time   BP 90/52 10/09/23 0900   Temp 97  F (36.1  C) 10/09/23 0900   Pulse 72    Resp 16 10/09/23 0900   SpO2 93 % 10/09/23 0900       Electronically Signed By: FELECIA Gibbs CRNA  October 9, 2023  9:02 AM

## 2023-10-09 NOTE — INTERVAL H&P NOTE
"I have reviewed the surgical (or preoperative) H&P that is linked to this encounter, and examined the patient. There are no significant changes    Clinical Conditions Present on Arrival:  Clinically Significant Risk Factors Present on Admission                  # Obesity: Estimated body mass index is 34.01 kg/m  as calculated from the following:    Height as of this encounter: 1.549 m (5' 1\").    Weight as of this encounter: 81.6 kg (180 lb).       "

## 2023-12-27 ENCOUNTER — HOSPITAL ENCOUNTER (EMERGENCY)
Facility: HOSPITAL | Age: 38
Discharge: HOME | End: 2023-12-27
Admitting: EMERGENCY MEDICINE
Payer: COMMERCIAL

## 2023-12-27 ENCOUNTER — APPOINTMENT (OUTPATIENT)
Dept: CT IMAGING | Facility: HOSPITAL | Age: 38
End: 2023-12-27
Attending: EMERGENCY MEDICINE
Payer: COMMERCIAL

## 2023-12-27 VITALS
RESPIRATION RATE: 16 BRPM | WEIGHT: 181.88 LBS | SYSTOLIC BLOOD PRESSURE: 127 MMHG | BODY MASS INDEX: 35.71 KG/M2 | TEMPERATURE: 98.4 F | OXYGEN SATURATION: 98 % | HEART RATE: 63 BPM | DIASTOLIC BLOOD PRESSURE: 70 MMHG | HEIGHT: 60 IN

## 2023-12-27 DIAGNOSIS — K57.92 DIVERTICULITIS: ICD-10-CM

## 2023-12-27 LAB
ALBUMIN SERPL BCG-MCNC: 4.2 G/DL (ref 3.5–5.2)
ALP SERPL-CCNC: 102 U/L (ref 40–150)
ALT SERPL W P-5'-P-CCNC: 16 U/L (ref 0–50)
ANION GAP SERPL CALCULATED.3IONS-SCNC: 10 MMOL/L (ref 7–15)
AST SERPL W P-5'-P-CCNC: 18 U/L (ref 0–45)
BASOPHILS # BLD AUTO: 0.1 10E3/UL (ref 0–0.2)
BASOPHILS NFR BLD AUTO: 1 %
BILIRUB SERPL-MCNC: 0.2 MG/DL
BUN SERPL-MCNC: 11.6 MG/DL (ref 6–20)
CALCIUM SERPL-MCNC: 9.4 MG/DL (ref 8.6–10)
CHLORIDE SERPL-SCNC: 105 MMOL/L (ref 98–107)
CREAT SERPL-MCNC: 0.8 MG/DL (ref 0.51–0.95)
DEPRECATED HCO3 PLAS-SCNC: 27 MMOL/L (ref 22–29)
EGFRCR SERPLBLD CKD-EPI 2021: >90 ML/MIN/1.73M2
EOSINOPHIL # BLD AUTO: 0.2 10E3/UL (ref 0–0.7)
EOSINOPHIL NFR BLD AUTO: 1 %
ERYTHROCYTE [DISTWIDTH] IN BLOOD BY AUTOMATED COUNT: 12.9 % (ref 10–15)
GLUCOSE SERPL-MCNC: 109 MG/DL (ref 70–99)
HCT VFR BLD AUTO: 43.4 % (ref 35–47)
HGB BLD-MCNC: 14.5 G/DL (ref 11.7–15.7)
HOLD SPECIMEN: NORMAL
HOLD SPECIMEN: NORMAL
IMM GRANULOCYTES # BLD: 0.1 10E3/UL
IMM GRANULOCYTES NFR BLD: 1 %
LIPASE SERPL-CCNC: 27 U/L (ref 13–60)
LYMPHOCYTES # BLD AUTO: 4.8 10E3/UL (ref 0.8–5.3)
LYMPHOCYTES NFR BLD AUTO: 34 %
MCH RBC QN AUTO: 29.5 PG (ref 26.5–33)
MCHC RBC AUTO-ENTMCNC: 33.4 G/DL (ref 31.5–36.5)
MCV RBC AUTO: 88 FL (ref 78–100)
MONOCYTES # BLD AUTO: 1.2 10E3/UL (ref 0–1.3)
MONOCYTES NFR BLD AUTO: 8 %
NEUTROPHILS # BLD AUTO: 7.8 10E3/UL (ref 1.6–8.3)
NEUTROPHILS NFR BLD AUTO: 55 %
NRBC # BLD AUTO: 0 10E3/UL
NRBC BLD AUTO-RTO: 0 /100
PLATELET # BLD AUTO: 468 10E3/UL (ref 150–450)
POTASSIUM SERPL-SCNC: 4.3 MMOL/L (ref 3.4–5.3)
PROT SERPL-MCNC: 7.1 G/DL (ref 6.4–8.3)
RBC # BLD AUTO: 4.92 10E6/UL (ref 3.8–5.2)
SODIUM SERPL-SCNC: 142 MMOL/L (ref 135–145)
WBC # BLD AUTO: 14 10E3/UL (ref 4–11)

## 2023-12-27 PROCEDURE — 258N000003 HC RX IP 258 OP 636: Performed by: EMERGENCY MEDICINE

## 2023-12-27 PROCEDURE — 250N000011 HC RX IP 250 OP 636: Performed by: EMERGENCY MEDICINE

## 2023-12-27 PROCEDURE — 96374 THER/PROPH/DIAG INJ IV PUSH: CPT | Mod: 59

## 2023-12-27 PROCEDURE — 36415 COLL VENOUS BLD VENIPUNCTURE: CPT | Performed by: STUDENT IN AN ORGANIZED HEALTH CARE EDUCATION/TRAINING PROGRAM

## 2023-12-27 PROCEDURE — 74177 CT ABD & PELVIS W/CONTRAST: CPT

## 2023-12-27 PROCEDURE — 96361 HYDRATE IV INFUSION ADD-ON: CPT

## 2023-12-27 PROCEDURE — 99285 EMERGENCY DEPT VISIT HI MDM: CPT | Mod: 25

## 2023-12-27 PROCEDURE — 85041 AUTOMATED RBC COUNT: CPT | Performed by: STUDENT IN AN ORGANIZED HEALTH CARE EDUCATION/TRAINING PROGRAM

## 2023-12-27 PROCEDURE — 83690 ASSAY OF LIPASE: CPT | Performed by: EMERGENCY MEDICINE

## 2023-12-27 PROCEDURE — 96375 TX/PRO/DX INJ NEW DRUG ADDON: CPT

## 2023-12-27 PROCEDURE — 80053 COMPREHEN METABOLIC PANEL: CPT | Performed by: STUDENT IN AN ORGANIZED HEALTH CARE EDUCATION/TRAINING PROGRAM

## 2023-12-27 RX ORDER — ONDANSETRON 4 MG/1
4 TABLET, ORALLY DISINTEGRATING ORAL EVERY 8 HOURS PRN
Qty: 10 TABLET | Refills: 0 | Status: SHIPPED | OUTPATIENT
Start: 2023-12-27 | End: 2023-12-30

## 2023-12-27 RX ORDER — METRONIDAZOLE 500 MG/1
500 TABLET ORAL 2 TIMES DAILY
Qty: 14 TABLET | Refills: 0 | Status: SHIPPED | OUTPATIENT
Start: 2023-12-27 | End: 2024-01-03

## 2023-12-27 RX ORDER — KETOROLAC TROMETHAMINE 15 MG/ML
15 INJECTION, SOLUTION INTRAMUSCULAR; INTRAVENOUS ONCE
Status: COMPLETED | OUTPATIENT
Start: 2023-12-27 | End: 2023-12-27

## 2023-12-27 RX ORDER — CIPROFLOXACIN 500 MG/1
500 TABLET, FILM COATED ORAL 2 TIMES DAILY
Qty: 14 TABLET | Refills: 0 | Status: SHIPPED | OUTPATIENT
Start: 2023-12-27 | End: 2024-01-03

## 2023-12-27 RX ORDER — ONDANSETRON 2 MG/ML
4 INJECTION INTRAMUSCULAR; INTRAVENOUS ONCE
Status: COMPLETED | OUTPATIENT
Start: 2023-12-27 | End: 2023-12-27

## 2023-12-27 RX ORDER — IOPAMIDOL 755 MG/ML
90 INJECTION, SOLUTION INTRAVASCULAR ONCE
Status: COMPLETED | OUTPATIENT
Start: 2023-12-27 | End: 2023-12-27

## 2023-12-27 RX ADMIN — KETOROLAC TROMETHAMINE 15 MG: 15 INJECTION, SOLUTION INTRAMUSCULAR; INTRAVENOUS at 15:10

## 2023-12-27 RX ADMIN — SODIUM CHLORIDE, POTASSIUM CHLORIDE, SODIUM LACTATE AND CALCIUM CHLORIDE 1000 ML: 600; 310; 30; 20 INJECTION, SOLUTION INTRAVENOUS at 15:10

## 2023-12-27 RX ADMIN — ONDANSETRON 4 MG: 2 INJECTION INTRAMUSCULAR; INTRAVENOUS at 15:08

## 2023-12-27 RX ADMIN — IOPAMIDOL 90 ML: 755 INJECTION, SOLUTION INTRAVENOUS at 16:22

## 2023-12-27 ASSESSMENT — ENCOUNTER SYMPTOMS
APPETITE CHANGE: 1
ABDOMINAL PAIN: 1
CONSTIPATION: 0
COUGH: 0
VOMITING: 0
DYSURIA: 0
SHORTNESS OF BREATH: 0
CHILLS: 0
FEVER: 0
NAUSEA: 1
BLOOD IN STOOL: 0
DIARRHEA: 0
HEMATURIA: 0

## 2023-12-27 NOTE — ED PROVIDER NOTES
EMERGENCY DEPARTMENT ENCOUNTER      NAME: Dayana Mcclain  AGE: 38 year old female  YOB: 1985  MRN: [id number]  EVALUATION DATE & TIME: No admission date for patient encounter.    PCP: Debbi Burgos    ED PROVIDER: Sally Tatum PA-C      Chief Complaint   Patient presents with    Abdominal Pain         FINAL IMPRESSION:  1. Diverticulitis          ED COURSE & MEDICAL DECISION MAKING:    Pertinent Labs & Imaging studies reviewed. (See chart for details)    38 year old female presents to the Emergency Department for evaluation of abdominal pain.    Physical exam is remarkable for a generally well-appearing female who is in no acute distress.  Heart and lung sounds are clear diffusely throughout.  She has diffuse mid abdominal tenderness to palpation without rebound or guarding.  No CVA tenderness.  Vital signs are stable and she is afebrile.    CBC remarkable for leukocytosis with white blood cell count of 14.0, no anemia noted; thrombocytosis with platelet count of 468 noted.  CMP is unremarkable with no significant electrolyte derangements, normal liver and kidney function.  Lipase within normal limits.  CT of the abdomen shows evidence of acute uncomplicated diverticulitis.    The patient was given IV fluids, Toradol, Zofran with improvement in her symptoms.  I do not think any further emergent labs or imaging are indicated at this time.  She is hemodynamically stable here and her lab work is overall reassuring.  No evidence of abscess or perforation on her CT scan and she is tolerating oral fluids so I think she is safe for discharge home.  Patient has penicillin allergy so ciprofloxacin and Flagyl prescribed, Zofran also prescribed to help with nausea/vomiting.  Advised to follow-up with her primary care provider for recheck and return here for any new or worsening symptoms.  The patient is agreeable with this treatment plan and verbalized understanding.    Medical Decision  Making    History:  Supplemental history from: Documented in chart, if applicable  External Record(s) reviewed: Inpatient Record: Hospitalization at  on 08/10/23    Work Up:  Chart documentation includes differential considered and any EKGs or imaging independently interpreted by provider, where specified.  In additional to work up documented, I considered the following work up: Documented in chart, if applicable.    External consultation:  Discussion of management with another provider: Documented in chart, if applicable    Complicating factors:  Care impacted by chronic illness: N/A  Care affected by social determinants of health: N/A    Disposition considerations: Discharge. I prescribed additional prescription strength medication(s) as charted. I considered admission, but discharged patient after significant clinical improvement.    ED Course   2:31 PM Performed my initial history and physical exam. Discussed workup in the emergency department, management of symptoms, and likely disposition.   5:04 PM I discussed the plan for discharge with the patient or family and they are agreeable.. We discussed supportive cares at home and reasons for return to the ER including new or worsening symptoms - all questions and concerns addressed. Patient to be discharged by RN.    At the conclusion of the encounter I discussed the results of all of the tests and the disposition. The questions were answered. The patient or family acknowledged understanding and was agreeable with the care plan.     Voice recognition software was used in the creation of this note. Any grammatical or nonsensical errors are due to inherent errors with the software and are not the intention of the writer.     MEDICATIONS GIVEN IN THE EMERGENCY:  Medications   ketorolac (TORADOL) injection 15 mg (15 mg Intravenous $Given 12/27/23 1510)   ondansetron (ZOFRAN) injection 4 mg (4 mg Intravenous $Given 12/27/23 1508)   lactated ringers BOLUS 1,000 mL (0  "mLs Intravenous Stopped 12/27/23 1719)   iopamidol (ISOVUE-370) solution 90 mL (90 mLs Intravenous $Given 12/27/23 1622)       NEW PRESCRIPTIONS STARTED AT TODAY'S ER VISIT  New Prescriptions    CIPROFLOXACIN (CIPRO) 500 MG TABLET    Take 1 tablet (500 mg) by mouth 2 times daily for 7 days    METRONIDAZOLE (FLAGYL) 500 MG TABLET    Take 1 tablet (500 mg) by mouth 2 times daily for 7 days    ONDANSETRON (ZOFRAN ODT) 4 MG ODT TAB    Take 1 tablet (4 mg) by mouth every 8 hours as needed for nausea or vomiting            =================================================================    HPI    Patient information was obtained from: Patient    Use of : N/A         Dayana Mcclain is a 38 year old female with PMH of pancreatitis, diverticulitis, asthma who presents to the ED via walk-in for evaluation of abdominal pain.     The patient reports that last night, she had sudden onset of abdominal pain.  She describes the pain as \"shooting\" and reports that it radiates across her mid abdomen.  She notes that the pain feels similar to a previous episode of pancreatitis.  She has associated nausea without vomiting and endorses a poor appetite.  She tried Tylenol without any improvement in her symptoms.  She states she quit smoking about 2 months ago, denies any alcohol use or drug use.    She denies any fevers, chills, chest pain, abdominal pain, diarrhea, constipation, black or bloody stools, dysuria, or hematuria.  She has had a partial hysterectomy.      REVIEW OF SYSTEMS   Review of Systems   Constitutional:  Positive for appetite change. Negative for chills and fever.   Respiratory:  Negative for cough and shortness of breath.    Cardiovascular:  Negative for chest pain.   Gastrointestinal:  Positive for abdominal pain and nausea. Negative for blood in stool, constipation, diarrhea and vomiting.   Genitourinary:  Negative for dysuria and hematuria.       All other systems reviewed and are negative unless " noted in HPI.      PAST MEDICAL HISTORY:  Past Medical History:   Diagnosis Date    Gastroesophageal reflux disease     Other chronic pain     Pancreatic disease     Uncomplicated asthma        PAST SURGICAL HISTORY:  Past Surgical History:   Procedure Laterality Date    BIOPSY BREAST Left 10/9/2023    Procedure: Left Breast Biopsy;  Surgeon: Kiki Joaquin MD;  Location: [facility]     SECTION      IR CYSTOGRAM  4/3/2018    KIDNEY STONE SURGERY  10-    LAPAROSCOPIC CYSTECTOMY OVARIAN (ONCOLOGY)         CURRENT MEDICATIONS:    ciprofloxacin (CIPRO) 500 MG tablet  metroNIDAZOLE (FLAGYL) 500 MG tablet  ondansetron (ZOFRAN ODT) 4 MG ODT tab  acetaminophen (TYLENOL) 325 MG tablet  estradiol (VIVELLE-DOT) 0.05 MG/24HR bi-weekly patch  gabapentin (NEURONTIN) 100 MG capsule  HYDROcodone-acetaminophen (NORCO) 5-325 MG tablet        ALLERGIES:  Allergies   Allergen Reactions    Vancomycin Itching     Complained of itching hands/arms after infusion completed.    Amoxicillin Rash    Erythromycin Nausea and Vomiting and Rash       FAMILY HISTORY:  Family History   Problem Relation Age of Onset    No Known Problems Mother     No Known Problems Father        SOCIAL HISTORY:   Social History     Socioeconomic History    Marital status: Single   Tobacco Use    Smoking status: Every Day     Packs/day: 0.10     Years: 10.00     Additional pack years: 0.00     Total pack years: 1.00     Types: Cigarettes    Smokeless tobacco: Never    Tobacco comments:     3 months ago quit   Vaping Use    Vaping Use: Never used   Substance and Sexual Activity    Alcohol use: No    Drug use: No    Sexual activity: Yes     Partners: Male       VITALS:  Patient Vitals for the past 24 hrs:   BP Temp Temp src Pulse Resp SpO2 Height Weight   23 1717 127/70 -- -- 63 16 98 % -- --   23 1238 102/70 98.4  F (36.9  C) Oral 83 18 98 % 1.524 m (5') 82.5 kg (181 lb 14.1 oz)       PHYSICAL EXAM    VITAL SIGNS: /70   Pulse  63   Temp 98.4  F (36.9  C) (Oral)   Resp 16   Ht 1.524 m (5')   Wt 82.5 kg (181 lb 14.1 oz)   SpO2 98%   BMI 35.52 kg/m    General Appearance: Alert, cooperative, normal speech and facial symmetry, appears stated age, the patient does not appear in distress  Head:  Normocephalic, without obvious abnormality, atraumatic  Eyes: Conjunctiva/corneas clear, EOM's intact, no nystagmus, PERRL  ENT:  Lips, mucosa, and tongue normal; teeth and gums normal, no pharyngeal inflammation, no dysphonia or difficulty swallowing, membranes are moist without pallor  Cardio:  Regular rate and rhythm, S1 and S2 normal, no murmur, rub    or gallop, 2+ pulses symmetric in all extremities  Pulm:  Clear to auscultation bilaterally, respirations unlabored with no accessory muscle use  Abdomen: Diffuse mid-abdominal tenderness to palpation; Active bowel sounds in all quadrants; abdomen is soft, non-distended with no rebound tenderness or guarding.   Back: No midline tenderness or step-offs, no CVA tenderness  Extremities: Moves all extremities; no edema  Neuro: Patient is awake, alert, and responsive to voice. No gross motor weaknesses or sensory loss; moves all extremities.    LAB:  All pertinent labs reviewed and interpreted.  Labs Ordered and Resulted from Time of ED Arrival to Time of ED Departure   COMPREHENSIVE METABOLIC PANEL - Abnormal       Result Value    Sodium 142      Potassium 4.3      Carbon Dioxide (CO2) 27      Anion Gap 10      Urea Nitrogen 11.6      Creatinine 0.80      GFR Estimate >90      Calcium 9.4      Chloride 105      Glucose 109 (*)     Alkaline Phosphatase 102      AST 18      ALT 16      Protein Total 7.1      Albumin 4.2      Bilirubin Total 0.2     CBC WITH PLATELETS AND DIFFERENTIAL - Abnormal    WBC Count 14.0 (*)     RBC Count 4.92      Hemoglobin 14.5      Hematocrit 43.4      MCV 88      MCH 29.5      MCHC 33.4      RDW 12.9      Platelet Count 468 (*)     % Neutrophils 55      % Lymphocytes 34       % Monocytes 8      % Eosinophils 1      % Basophils 1      % Immature Granulocytes 1      NRBCs per 100 WBC 0      Absolute Neutrophils 7.8      Absolute Lymphocytes 4.8      Absolute Monocytes 1.2      Absolute Eosinophils 0.2      Absolute Basophils 0.1      Absolute Immature Granulocytes 0.1      Absolute NRBCs 0.0     LIPASE - Normal    Lipase 27     ROUTINE UA WITH MICROSCOPIC REFLEX TO CULTURE       RADIOLOGY:  Reviewed all pertinent imaging. Please see official radiology report.  CT Abdomen Pelvis w Contrast   Final Result   IMPRESSION:    Findings suggestive of minimal acute uncomplicated sigmoid diverticulitis.               Sally Tatum PA-C  Emergency Medicine  [facility] EMERGENCY DEPARTMENT  [facility] [facility] [id number] [phone]  Dept: [phone]       Sally Tatum PA-C  12/27/23 7890

## 2023-12-27 NOTE — DISCHARGE INSTRUCTIONS
You were seen here today for evaluation of abdominal pain.  Your lab work today showed evidence of a mild infection but no evidence of pancreatitis, electrolyte problems, or liver/kidney dysfunction.  Your CT scan shows evidence of mild diverticulitis.      I will prescribe you antibiotics, please take the entire course even if you are feeling better. I will prescribe you zofran for nausea/vomiting if needed.     You may take Tylenol and ibuprofen for pain/fever, do not exceed 4000 mg of Tylenol per day or 3200 mg ofibuprofen per day.    Follow-up with your primary care provider next week for recheck.  Return here for any new or worsening symptoms including severe pain, fever, persistent vomiting or inability to tolerate fluids, black or bloody stools, blood in your vomit or coffee-ground appearing vomit, or any other symptoms that concern you.

## 2023-12-27 NOTE — Clinical Note
Dayana Mcclain was seen and treated in our emergency department on 12/27/2023.  She may return to work on 12/29/2023.       If you have any questions or concerns, please don't hesitate to call.      Sally Tatum PA-C

## 2023-12-27 NOTE — ED TRIAGE NOTES
Hx of pancreatitis and diverticulitis and feels the same discomfort.   Lower abdominal pain.  no BM change.  Denies any alcohol drinking.        Triage Assessment (Adult)       Row Name 12/27/23 1239          Triage Assessment    Airway WDL WDL        Respiratory WDL    Respiratory WDL WDL        Skin Circulation/Temperature WDL    Skin Circulation/Temperature WDL WDL        Cardiac WDL    Cardiac WDL WDL        Peripheral/Neurovascular WDL    Peripheral Neurovascular WDL WDL        Cognitive/Neuro/Behavioral WDL    Cognitive/Neuro/Behavioral WDL WDL

## 2024-03-28 ENCOUNTER — TRANSFERRED RECORDS (OUTPATIENT)
Dept: HEALTH INFORMATION MANAGEMENT | Facility: CLINIC | Age: 39
End: 2024-03-28

## 2024-09-16 ENCOUNTER — ANCILLARY PROCEDURE (OUTPATIENT)
Dept: MAMMOGRAPHY | Facility: CLINIC | Age: 39
End: 2024-09-16
Attending: OBSTETRICS & GYNECOLOGY
Payer: COMMERCIAL

## 2024-09-16 DIAGNOSIS — N63.20 MASS OF LEFT BREAST: ICD-10-CM

## 2024-09-16 PROCEDURE — 77062 BREAST TOMOSYNTHESIS BI: CPT

## 2024-09-16 PROCEDURE — 76642 ULTRASOUND BREAST LIMITED: CPT | Mod: LT

## 2024-09-18 ENCOUNTER — MEDICAL CORRESPONDENCE (OUTPATIENT)
Dept: HEALTH INFORMATION MANAGEMENT | Facility: CLINIC | Age: 39
End: 2024-09-18
Payer: COMMERCIAL

## 2024-11-16 ENCOUNTER — HEALTH MAINTENANCE LETTER (OUTPATIENT)
Age: 39
End: 2024-11-16

## 2025-02-19 ENCOUNTER — LAB REQUISITION (OUTPATIENT)
Dept: LAB | Facility: CLINIC | Age: 40
End: 2025-02-19
Payer: COMMERCIAL

## 2025-02-19 DIAGNOSIS — Z13.220 ENCOUNTER FOR SCREENING FOR LIPOID DISORDERS: ICD-10-CM

## 2025-02-19 DIAGNOSIS — Z13.1 ENCOUNTER FOR SCREENING FOR DIABETES MELLITUS: ICD-10-CM

## 2025-02-19 PROCEDURE — 82947 ASSAY GLUCOSE BLOOD QUANT: CPT | Mod: ORL | Performed by: OBSTETRICS & GYNECOLOGY

## 2025-02-19 PROCEDURE — 80061 LIPID PANEL: CPT | Mod: ORL | Performed by: OBSTETRICS & GYNECOLOGY

## 2025-02-20 LAB
CHOLEST SERPL-MCNC: 156 MG/DL
FASTING STATUS PATIENT QL REPORTED: NO
FASTING STATUS PATIENT QL REPORTED: NO
GLUCOSE SERPL-MCNC: 100 MG/DL (ref 70–99)
HDLC SERPL-MCNC: 41 MG/DL
LDLC SERPL CALC-MCNC: 91 MG/DL
NONHDLC SERPL-MCNC: 115 MG/DL
TRIGL SERPL-MCNC: 122 MG/DL